# Patient Record
Sex: MALE | Race: WHITE | Employment: FULL TIME | URBAN - METROPOLITAN AREA
[De-identification: names, ages, dates, MRNs, and addresses within clinical notes are randomized per-mention and may not be internally consistent; named-entity substitution may affect disease eponyms.]

---

## 2017-03-03 ENCOUNTER — LAB ENCOUNTER (OUTPATIENT)
Dept: LAB | Facility: HOSPITAL | Age: 62
End: 2017-03-03
Attending: INTERNAL MEDICINE
Payer: COMMERCIAL

## 2017-03-03 DIAGNOSIS — Z00.00 ROUTINE GENERAL MEDICAL EXAMINATION AT A HEALTH CARE FACILITY: ICD-10-CM

## 2017-03-03 DIAGNOSIS — E55.9 AVITAMINOSIS D: ICD-10-CM

## 2017-03-03 DIAGNOSIS — Z12.5 SPECIAL SCREENING FOR MALIGNANT NEOPLASM OF PROSTATE: ICD-10-CM

## 2017-03-03 DIAGNOSIS — Z11.59 SCREENING EXAMINATION FOR POLIOMYELITIS: ICD-10-CM

## 2017-03-03 DIAGNOSIS — Z01.818 PREOP EXAMINATION: Primary | ICD-10-CM

## 2017-03-03 LAB
ALBUMIN SERPL BCP-MCNC: 3.9 G/DL (ref 3.5–4.8)
ALBUMIN/GLOB SERPL: 1.4 {RATIO} (ref 1–2)
ALP SERPL-CCNC: 72 U/L (ref 32–100)
ALT SERPL-CCNC: 23 U/L (ref 17–63)
ANION GAP SERPL CALC-SCNC: 7 MMOL/L (ref 0–18)
ANTIBODY SCREEN: NEGATIVE
AST SERPL-CCNC: 20 U/L (ref 15–41)
BASOPHILS # BLD: 0.1 K/UL (ref 0–0.2)
BASOPHILS NFR BLD: 1 %
BILIRUB SERPL-MCNC: 1.2 MG/DL (ref 0.3–1.2)
BUN SERPL-MCNC: 20 MG/DL (ref 8–20)
BUN/CREAT SERPL: 21.5 (ref 10–20)
CALCIUM SERPL-MCNC: 9.2 MG/DL (ref 8.5–10.5)
CHLORIDE SERPL-SCNC: 106 MMOL/L (ref 95–110)
CHOLEST SERPL-MCNC: 154 MG/DL (ref 110–200)
CO2 SERPL-SCNC: 27 MMOL/L (ref 22–32)
CREAT SERPL-MCNC: 0.93 MG/DL (ref 0.5–1.5)
EOSINOPHIL # BLD: 0.2 K/UL (ref 0–0.7)
EOSINOPHIL NFR BLD: 4 %
ERYTHROCYTE [DISTWIDTH] IN BLOOD BY AUTOMATED COUNT: 13.2 % (ref 11–15)
GLOBULIN PLAS-MCNC: 2.8 G/DL (ref 2.5–3.7)
GLUCOSE SERPL-MCNC: 108 MG/DL (ref 70–99)
HAV AB SER QL IA: NONREACTIVE
HBV CORE AB SERPL QL IA: NONREACTIVE
HBV SURFACE AB SER-ACNC: 4.69 MIU/ML (ref ?–10)
HBV SURFACE AG SERPL QL IA: NONREACTIVE
HBV SURFACE AG SERPL QL IA: NONREACTIVE
HCT VFR BLD AUTO: 43 % (ref 41–52)
HCV AB SERPL QL IA: NONREACTIVE
HDLC SERPL-MCNC: 45 MG/DL
HGB BLD-MCNC: 14.5 G/DL (ref 13.5–17.5)
LDLC SERPL CALC-MCNC: 94 MG/DL (ref 0–99)
LYMPHOCYTES # BLD: 1.3 K/UL (ref 1–4)
LYMPHOCYTES NFR BLD: 20 %
MCH RBC QN AUTO: 30.1 PG (ref 27–32)
MCHC RBC AUTO-ENTMCNC: 33.7 G/DL (ref 32–37)
MCV RBC AUTO: 89.4 FL (ref 80–100)
MONOCYTES # BLD: 0.5 K/UL (ref 0–1)
MONOCYTES NFR BLD: 8 %
MRSA DNA SPEC QL NAA+PROBE: NEGATIVE
NEUTROPHILS # BLD AUTO: 4.4 K/UL (ref 1.8–7.7)
NEUTROPHILS NFR BLD: 68 %
NONHDLC SERPL-MCNC: 109 MG/DL
OSMOLALITY UR CALC.SUM OF ELEC: 293 MOSM/KG (ref 275–295)
PLATELET # BLD AUTO: 195 K/UL (ref 140–400)
PMV BLD AUTO: 8.4 FL (ref 7.4–10.3)
POTASSIUM SERPL-SCNC: 4.2 MMOL/L (ref 3.3–5.1)
PROT SERPL-MCNC: 6.7 G/DL (ref 5.9–8.4)
PSA SERPL-MCNC: 0.6 NG/ML (ref 0–4)
RBC # BLD AUTO: 4.81 M/UL (ref 4.5–5.9)
RH BLOOD TYPE: POSITIVE
SODIUM SERPL-SCNC: 140 MMOL/L (ref 136–144)
TRIGL SERPL-MCNC: 73 MG/DL (ref 1–149)
WBC # BLD AUTO: 6.5 K/UL (ref 4–11)

## 2017-03-03 PROCEDURE — 80053 COMPREHEN METABOLIC PANEL: CPT

## 2017-03-03 PROCEDURE — 85025 COMPLETE CBC W/AUTO DIFF WBC: CPT

## 2017-03-03 PROCEDURE — 86850 RBC ANTIBODY SCREEN: CPT

## 2017-03-03 PROCEDURE — 86704 HEP B CORE ANTIBODY TOTAL: CPT

## 2017-03-03 PROCEDURE — 86708 HEPATITIS A ANTIBODY: CPT

## 2017-03-03 PROCEDURE — 80500 HEPATITIS A B + C PROFILE: CPT

## 2017-03-03 PROCEDURE — 87641 MR-STAPH DNA AMP PROBE: CPT

## 2017-03-03 PROCEDURE — 86706 HEP B SURFACE ANTIBODY: CPT

## 2017-03-03 PROCEDURE — 87340 HEPATITIS B SURFACE AG IA: CPT

## 2017-03-03 PROCEDURE — 86900 BLOOD TYPING SEROLOGIC ABO: CPT

## 2017-03-03 PROCEDURE — 80061 LIPID PANEL: CPT

## 2017-03-03 PROCEDURE — 36415 COLL VENOUS BLD VENIPUNCTURE: CPT

## 2017-03-03 PROCEDURE — 86901 BLOOD TYPING SEROLOGIC RH(D): CPT

## 2017-03-03 PROCEDURE — 82306 VITAMIN D 25 HYDROXY: CPT

## 2017-03-03 PROCEDURE — 86803 HEPATITIS C AB TEST: CPT

## 2017-03-06 LAB — 25(OH)D3 SERPL-MCNC: 24.7 NG/ML

## 2017-03-06 RX ORDER — ALPRAZOLAM 0.25 MG/1
0.25 TABLET ORAL NIGHTLY PRN
COMMUNITY

## 2017-03-06 RX ORDER — METOCLOPRAMIDE 10 MG/1
10 TABLET ORAL ONCE
Status: DISCONTINUED | OUTPATIENT
Start: 2017-03-06 | End: 2017-03-13 | Stop reason: HOSPADM

## 2017-03-06 RX ORDER — ACETAMINOPHEN 325 MG/1
650 TABLET ORAL ONCE
Status: DISCONTINUED | OUTPATIENT
Start: 2017-03-06 | End: 2017-03-13 | Stop reason: HOSPADM

## 2017-03-06 RX ORDER — MELOXICAM 7.5 MG/1
TABLET ORAL 2 TIMES DAILY
Status: ON HOLD | COMMUNITY
End: 2017-03-13

## 2017-03-06 RX ORDER — SODIUM CHLORIDE, SODIUM LACTATE, POTASSIUM CHLORIDE, CALCIUM CHLORIDE 600; 310; 30; 20 MG/100ML; MG/100ML; MG/100ML; MG/100ML
INJECTION, SOLUTION INTRAVENOUS CONTINUOUS
Status: DISCONTINUED | OUTPATIENT
Start: 2017-03-06 | End: 2017-03-16

## 2017-03-06 RX ORDER — FAMOTIDINE 20 MG/1
20 TABLET ORAL ONCE
Status: DISCONTINUED | OUTPATIENT
Start: 2017-03-06 | End: 2017-03-13 | Stop reason: HOSPADM

## 2017-03-08 NOTE — H&P
[facility]    PATIENT'S NAME: Carlton Campo   ATTENDING PHYSICIAN: Kashmir Oliveira MD   PATIENT ACCOUNT#:   [de-identified]    LOCATION:  [facility]  MEDICAL RECORD #:   [id number]       YOB: 1955  ADMISSION DATE:       03/13/2017 The patient is a former smoker, quitting in 2008. Alcohol use, minimal.      REVIEW OF SYSTEMS:  Patient denies any bleeding problems, headaches, fevers, chills, sweats, night sweats, weight loss, or weight gain.   The patient has not had any chest pain o were negative. EKG reveals normal sinus rhythm with occasional premature atrial contraction. There are no acute changes. IMPRESSION AND PLAN ON ADMISSION:    1. Preoperative evaluation for left total knee arthroplasty.   Patient is set for surge

## 2017-03-12 ENCOUNTER — ANESTHESIA EVENT (OUTPATIENT)
Dept: SURGERY | Facility: HOSPITAL | Age: 62
DRG: 470 | End: 2017-03-12
Payer: COMMERCIAL

## 2017-03-13 ENCOUNTER — APPOINTMENT (OUTPATIENT)
Dept: GENERAL RADIOLOGY | Facility: HOSPITAL | Age: 62
DRG: 470 | End: 2017-03-13
Attending: ORTHOPAEDIC SURGERY
Payer: COMMERCIAL

## 2017-03-13 ENCOUNTER — SURGERY (OUTPATIENT)
Age: 62
End: 2017-03-13

## 2017-03-13 ENCOUNTER — ANESTHESIA (OUTPATIENT)
Dept: SURGERY | Facility: HOSPITAL | Age: 62
DRG: 470 | End: 2017-03-13
Payer: COMMERCIAL

## 2017-03-13 ENCOUNTER — HOSPITAL ENCOUNTER (INPATIENT)
Facility: HOSPITAL | Age: 62
LOS: 5 days | Discharge: HOME HEALTH | DRG: 470 | End: 2017-03-18
Attending: ORTHOPAEDIC SURGERY | Admitting: ORTHOPAEDIC SURGERY
Payer: COMMERCIAL

## 2017-03-13 DIAGNOSIS — M17.12 PRIMARY OSTEOARTHRITIS OF LEFT KNEE: Primary | ICD-10-CM

## 2017-03-13 PROCEDURE — 3E0T3CZ INTRODUCTION OF REGIONAL ANESTHETIC INTO PERIPHERAL NERVES AND PLEXI, PERCUTANEOUS APPROACH: ICD-10-PCS | Performed by: ANESTHESIOLOGY

## 2017-03-13 PROCEDURE — 64450 NJX AA&/STRD OTHER PN/BRANCH: CPT | Performed by: ANESTHESIOLOGY

## 2017-03-13 PROCEDURE — 0SRD0J9 REPLACEMENT OF LEFT KNEE JOINT WITH SYNTHETIC SUBSTITUTE, CEMENTED, OPEN APPROACH: ICD-10-PCS | Performed by: ORTHOPAEDIC SURGERY

## 2017-03-13 PROCEDURE — 97116 GAIT TRAINING THERAPY: CPT

## 2017-03-13 PROCEDURE — 64445 NJX AA&/STRD SCIATIC NRV IMG: CPT | Performed by: ORTHOPAEDIC SURGERY

## 2017-03-13 PROCEDURE — 64447 NJX AA&/STRD FEMORAL NRV IMG: CPT | Performed by: ORTHOPAEDIC SURGERY

## 2017-03-13 PROCEDURE — 88305 TISSUE EXAM BY PATHOLOGIST: CPT | Performed by: ORTHOPAEDIC SURGERY

## 2017-03-13 PROCEDURE — 76942 ECHO GUIDE FOR BIOPSY: CPT | Performed by: ORTHOPAEDIC SURGERY

## 2017-03-13 PROCEDURE — 73560 X-RAY EXAM OF KNEE 1 OR 2: CPT

## 2017-03-13 PROCEDURE — 88311 DECALCIFY TISSUE: CPT | Performed by: ORTHOPAEDIC SURGERY

## 2017-03-13 PROCEDURE — 97162 PT EVAL MOD COMPLEX 30 MIN: CPT

## 2017-03-13 PROCEDURE — 99152 MOD SED SAME PHYS/QHP 5/>YRS: CPT | Performed by: ORTHOPAEDIC SURGERY

## 2017-03-13 DEVICE — IMPLANTABLE DEVICE: Type: IMPLANTABLE DEVICE | Site: KNEE | Status: FUNCTIONAL

## 2017-03-13 DEVICE — COMPONENT PTLR 34MM 1 PG WRE: Type: IMPLANTABLE DEVICE | Status: FUNCTIONAL

## 2017-03-13 DEVICE — CEMENT BONE ZIM PALICOS R: Type: IMPLANTABLE DEVICE | Status: FUNCTIONAL

## 2017-03-13 RX ORDER — METOCLOPRAMIDE 10 MG/1
10 TABLET ORAL ONCE
Status: COMPLETED | OUTPATIENT
Start: 2017-03-13 | End: 2017-03-13

## 2017-03-13 RX ORDER — ONDANSETRON 2 MG/ML
4 INJECTION INTRAMUSCULAR; INTRAVENOUS ONCE AS NEEDED
Status: ACTIVE | OUTPATIENT
Start: 2017-03-13 | End: 2017-03-13

## 2017-03-13 RX ORDER — BUPIVACAINE HYDROCHLORIDE 7.5 MG/ML
INJECTION, SOLUTION INTRASPINAL AS NEEDED
Status: DISCONTINUED | OUTPATIENT
Start: 2017-03-13 | End: 2017-03-13 | Stop reason: SURG

## 2017-03-13 RX ORDER — SODIUM CHLORIDE 9 MG/ML
INJECTION, SOLUTION INTRAVENOUS
Status: COMPLETED
Start: 2017-03-13 | End: 2017-03-13

## 2017-03-13 RX ORDER — HYDROMORPHONE HYDROCHLORIDE 1 MG/ML
0.2 INJECTION, SOLUTION INTRAMUSCULAR; INTRAVENOUS; SUBCUTANEOUS EVERY 5 MIN PRN
Status: DISCONTINUED | OUTPATIENT
Start: 2017-03-13 | End: 2017-03-13 | Stop reason: HOSPADM

## 2017-03-13 RX ORDER — DIPHENHYDRAMINE HYDROCHLORIDE 50 MG/ML
12.5 INJECTION INTRAMUSCULAR; INTRAVENOUS EVERY 4 HOURS PRN
Status: ACTIVE | OUTPATIENT
Start: 2017-03-13 | End: 2017-03-14

## 2017-03-13 RX ORDER — WARFARIN SODIUM 5 MG/1
5 TABLET ORAL ONCE
Status: COMPLETED | OUTPATIENT
Start: 2017-03-13 | End: 2017-03-13

## 2017-03-13 RX ORDER — DIPHENHYDRAMINE HCL 25 MG
25 CAPSULE ORAL EVERY 4 HOURS PRN
Status: DISPENSED | OUTPATIENT
Start: 2017-03-13 | End: 2017-03-14

## 2017-03-13 RX ORDER — MAGNESIUM HYDROXIDE 1200 MG/15ML
LIQUID ORAL CONTINUOUS PRN
Status: DISCONTINUED | OUTPATIENT
Start: 2017-03-13 | End: 2017-03-13

## 2017-03-13 RX ORDER — LORAZEPAM 1 MG/1
1 TABLET ORAL EVERY 6 HOURS PRN
Status: DISCONTINUED | OUTPATIENT
Start: 2017-03-13 | End: 2017-03-18

## 2017-03-13 RX ORDER — HYDROMORPHONE HYDROCHLORIDE 1 MG/ML
0.6 INJECTION, SOLUTION INTRAMUSCULAR; INTRAVENOUS; SUBCUTANEOUS
Status: DISPENSED | OUTPATIENT
Start: 2017-03-13 | End: 2017-03-14

## 2017-03-13 RX ORDER — MORPHINE SULFATE 2 MG/ML
2 INJECTION, SOLUTION INTRAMUSCULAR; INTRAVENOUS EVERY 10 MIN PRN
Status: DISCONTINUED | OUTPATIENT
Start: 2017-03-13 | End: 2017-03-13 | Stop reason: HOSPADM

## 2017-03-13 RX ORDER — ONDANSETRON 2 MG/ML
4 INJECTION INTRAMUSCULAR; INTRAVENOUS ONCE AS NEEDED
Status: DISCONTINUED | OUTPATIENT
Start: 2017-03-13 | End: 2017-03-13

## 2017-03-13 RX ORDER — NALOXONE HYDROCHLORIDE 0.4 MG/ML
80 INJECTION, SOLUTION INTRAMUSCULAR; INTRAVENOUS; SUBCUTANEOUS AS NEEDED
Status: DISCONTINUED | OUTPATIENT
Start: 2017-03-13 | End: 2017-03-13 | Stop reason: HOSPADM

## 2017-03-13 RX ORDER — ACETAMINOPHEN 325 MG/1
650 TABLET ORAL EVERY 6 HOURS PRN
Status: DISCONTINUED | OUTPATIENT
Start: 2017-03-13 | End: 2017-03-14

## 2017-03-13 RX ORDER — LIDOCAINE HYDROCHLORIDE 10 MG/ML
INJECTION, SOLUTION EPIDURAL; INFILTRATION; INTRACAUDAL; PERINEURAL AS NEEDED
Status: DISCONTINUED | OUTPATIENT
Start: 2017-03-13 | End: 2017-03-13 | Stop reason: SURG

## 2017-03-13 RX ORDER — FAMOTIDINE 20 MG/1
20 TABLET ORAL ONCE
Status: COMPLETED | OUTPATIENT
Start: 2017-03-13 | End: 2017-03-13

## 2017-03-13 RX ORDER — MORPHINE SULFATE 10 MG/ML
6 INJECTION, SOLUTION INTRAMUSCULAR; INTRAVENOUS EVERY 10 MIN PRN
Status: DISCONTINUED | OUTPATIENT
Start: 2017-03-13 | End: 2017-03-13 | Stop reason: HOSPADM

## 2017-03-13 RX ORDER — ENOXAPARIN SODIUM 100 MG/ML
30 INJECTION SUBCUTANEOUS EVERY 12 HOURS SCHEDULED
Status: DISCONTINUED | OUTPATIENT
Start: 2017-03-13 | End: 2017-03-18

## 2017-03-13 RX ORDER — SODIUM CHLORIDE, SODIUM LACTATE, POTASSIUM CHLORIDE, CALCIUM CHLORIDE 600; 310; 30; 20 MG/100ML; MG/100ML; MG/100ML; MG/100ML
INJECTION, SOLUTION INTRAVENOUS CONTINUOUS
Status: DISCONTINUED | OUTPATIENT
Start: 2017-03-13 | End: 2017-03-16

## 2017-03-13 RX ORDER — CEFAZOLIN SODIUM 1 G/3ML
INJECTION, POWDER, FOR SOLUTION INTRAMUSCULAR; INTRAVENOUS AS NEEDED
Status: DISCONTINUED | OUTPATIENT
Start: 2017-03-13 | End: 2017-03-13 | Stop reason: SURG

## 2017-03-13 RX ORDER — NALOXONE HYDROCHLORIDE 0.4 MG/ML
0.08 INJECTION, SOLUTION INTRAMUSCULAR; INTRAVENOUS; SUBCUTANEOUS
Status: ACTIVE | OUTPATIENT
Start: 2017-03-13 | End: 2017-03-14

## 2017-03-13 RX ORDER — HYDROMORPHONE HYDROCHLORIDE 1 MG/ML
0.6 INJECTION, SOLUTION INTRAMUSCULAR; INTRAVENOUS; SUBCUTANEOUS EVERY 5 MIN PRN
Status: DISCONTINUED | OUTPATIENT
Start: 2017-03-13 | End: 2017-03-13 | Stop reason: HOSPADM

## 2017-03-13 RX ORDER — HALOPERIDOL 5 MG/ML
0.5 INJECTION INTRAMUSCULAR ONCE AS NEEDED
Status: DISPENSED | OUTPATIENT
Start: 2017-03-13 | End: 2017-03-13

## 2017-03-13 RX ORDER — HYDROCODONE BITARTRATE AND ACETAMINOPHEN 7.5; 325 MG/1; MG/1
2 TABLET ORAL EVERY 6 HOURS PRN
Status: DISCONTINUED | OUTPATIENT
Start: 2017-03-13 | End: 2017-03-14

## 2017-03-13 RX ORDER — ALPRAZOLAM 0.25 MG/1
0.25 TABLET ORAL NIGHTLY PRN
Status: DISCONTINUED | OUTPATIENT
Start: 2017-03-13 | End: 2017-03-18

## 2017-03-13 RX ORDER — MIDAZOLAM HYDROCHLORIDE 1 MG/ML
INJECTION INTRAMUSCULAR; INTRAVENOUS AS NEEDED
Status: DISCONTINUED | OUTPATIENT
Start: 2017-03-13 | End: 2017-03-13 | Stop reason: SURG

## 2017-03-13 RX ORDER — HYDROMORPHONE HYDROCHLORIDE 1 MG/ML
0.4 INJECTION, SOLUTION INTRAMUSCULAR; INTRAVENOUS; SUBCUTANEOUS EVERY 5 MIN PRN
Status: DISCONTINUED | OUTPATIENT
Start: 2017-03-13 | End: 2017-03-13 | Stop reason: HOSPADM

## 2017-03-13 RX ORDER — DEXAMETHASONE SODIUM PHOSPHATE 10 MG/ML
INJECTION, SOLUTION INTRAMUSCULAR; INTRAVENOUS AS NEEDED
Status: DISCONTINUED | OUTPATIENT
Start: 2017-03-13 | End: 2017-03-13 | Stop reason: SURG

## 2017-03-13 RX ORDER — SODIUM CHLORIDE 0.9 % (FLUSH) 0.9 %
10 SYRINGE (ML) INJECTION AS NEEDED
Status: DISCONTINUED | OUTPATIENT
Start: 2017-03-13 | End: 2017-03-18

## 2017-03-13 RX ORDER — HYDROMORPHONE HYDROCHLORIDE 1 MG/ML
0.4 INJECTION, SOLUTION INTRAMUSCULAR; INTRAVENOUS; SUBCUTANEOUS
Status: DISPENSED | OUTPATIENT
Start: 2017-03-13 | End: 2017-03-14

## 2017-03-13 RX ORDER — HYDROCODONE BITARTRATE AND ACETAMINOPHEN 7.5; 325 MG/1; MG/1
1 TABLET ORAL EVERY 6 HOURS PRN
Status: DISCONTINUED | OUTPATIENT
Start: 2017-03-13 | End: 2017-03-14

## 2017-03-13 RX ORDER — MORPHINE SULFATE 4 MG/ML
4 INJECTION, SOLUTION INTRAMUSCULAR; INTRAVENOUS EVERY 10 MIN PRN
Status: DISCONTINUED | OUTPATIENT
Start: 2017-03-13 | End: 2017-03-13 | Stop reason: HOSPADM

## 2017-03-13 RX ORDER — NALBUPHINE HCL 10 MG/ML
2.5 AMPUL (ML) INJECTION EVERY 4 HOURS PRN
Status: DISCONTINUED | OUTPATIENT
Start: 2017-03-13 | End: 2017-03-18

## 2017-03-13 RX ORDER — ROPIVACAINE HYDROCHLORIDE 5 MG/ML
INJECTION, SOLUTION EPIDURAL; INFILTRATION; PERINEURAL AS NEEDED
Status: DISCONTINUED | OUTPATIENT
Start: 2017-03-13 | End: 2017-03-13 | Stop reason: SURG

## 2017-03-13 RX ORDER — MORPHINE SULFATE 1 MG/ML
INJECTION, SOLUTION EPIDURAL; INTRATHECAL; INTRAVENOUS AS NEEDED
Status: DISCONTINUED | OUTPATIENT
Start: 2017-03-13 | End: 2017-03-13 | Stop reason: SURG

## 2017-03-13 RX ORDER — HYDROCODONE BITARTRATE AND ACETAMINOPHEN 5; 325 MG/1; MG/1
2 TABLET ORAL AS NEEDED
Status: DISCONTINUED | OUTPATIENT
Start: 2017-03-13 | End: 2017-03-13 | Stop reason: HOSPADM

## 2017-03-13 RX ORDER — ONDANSETRON 2 MG/ML
INJECTION INTRAMUSCULAR; INTRAVENOUS AS NEEDED
Status: DISCONTINUED | OUTPATIENT
Start: 2017-03-13 | End: 2017-03-13 | Stop reason: SURG

## 2017-03-13 RX ORDER — HYDROCODONE BITARTRATE AND ACETAMINOPHEN 5; 325 MG/1; MG/1
1 TABLET ORAL AS NEEDED
Status: DISCONTINUED | OUTPATIENT
Start: 2017-03-13 | End: 2017-03-13 | Stop reason: HOSPADM

## 2017-03-13 RX ADMIN — DEXAMETHASONE SODIUM PHOSPHATE 5 MG: 10 INJECTION, SOLUTION INTRAMUSCULAR; INTRAVENOUS at 07:10:00

## 2017-03-13 RX ADMIN — SODIUM CHLORIDE, SODIUM LACTATE, POTASSIUM CHLORIDE, CALCIUM CHLORIDE: 600; 310; 30; 20 INJECTION, SOLUTION INTRAVENOUS at 08:05:00

## 2017-03-13 RX ADMIN — SODIUM CHLORIDE, SODIUM LACTATE, POTASSIUM CHLORIDE, CALCIUM CHLORIDE: 600; 310; 30; 20 INJECTION, SOLUTION INTRAVENOUS at 09:10:00

## 2017-03-13 RX ADMIN — ONDANSETRON 4 MG: 2 INJECTION INTRAMUSCULAR; INTRAVENOUS at 10:16:00

## 2017-03-13 RX ADMIN — LIDOCAINE HYDROCHLORIDE 25 MG: 10 INJECTION, SOLUTION EPIDURAL; INFILTRATION; INTRACAUDAL; PERINEURAL at 07:44:00

## 2017-03-13 RX ADMIN — ROPIVACAINE HYDROCHLORIDE 30 ML: 5 INJECTION, SOLUTION EPIDURAL; INFILTRATION; PERINEURAL at 07:10:00

## 2017-03-13 RX ADMIN — CEFAZOLIN SODIUM 2 G: 1 INJECTION, POWDER, FOR SOLUTION INTRAMUSCULAR; INTRAVENOUS at 07:49:00

## 2017-03-13 RX ADMIN — MIDAZOLAM HYDROCHLORIDE 2 MG: 1 INJECTION INTRAMUSCULAR; INTRAVENOUS at 07:13:00

## 2017-03-13 RX ADMIN — SODIUM CHLORIDE, SODIUM LACTATE, POTASSIUM CHLORIDE, CALCIUM CHLORIDE: 600; 310; 30; 20 INJECTION, SOLUTION INTRAVENOUS at 09:11:00

## 2017-03-13 RX ADMIN — BUPIVACAINE HYDROCHLORIDE 1.4 ML: 7.5 INJECTION, SOLUTION INTRASPINAL at 07:46:00

## 2017-03-13 RX ADMIN — MORPHINE SULFATE 0.3 MG: 1 INJECTION, SOLUTION EPIDURAL; INTRATHECAL; INTRAVENOUS at 07:46:00

## 2017-03-13 RX ADMIN — ROPIVACAINE HYDROCHLORIDE 15 ML: 5 INJECTION, SOLUTION EPIDURAL; INFILTRATION; PERINEURAL at 07:23:00

## 2017-03-13 RX ADMIN — SODIUM CHLORIDE, SODIUM LACTATE, POTASSIUM CHLORIDE, CALCIUM CHLORIDE: 600; 310; 30; 20 INJECTION, SOLUTION INTRAVENOUS at 07:30:00

## 2017-03-13 RX ADMIN — DEXAMETHASONE SODIUM PHOSPHATE 5 MG: 10 INJECTION, SOLUTION INTRAMUSCULAR; INTRAVENOUS at 07:23:00

## 2017-03-13 RX ADMIN — LIDOCAINE HYDROCHLORIDE 25 MG: 10 INJECTION, SOLUTION EPIDURAL; INFILTRATION; INTRACAUDAL; PERINEURAL at 07:13:00

## 2017-03-13 RX ADMIN — LIDOCAINE HYDROCHLORIDE 20 MG: 10 INJECTION, SOLUTION EPIDURAL; INFILTRATION; INTRACAUDAL; PERINEURAL at 08:00:00

## 2017-03-13 RX ADMIN — LIDOCAINE HYDROCHLORIDE 25 MG: 10 INJECTION, SOLUTION EPIDURAL; INFILTRATION; INTRACAUDAL; PERINEURAL at 06:58:00

## 2017-03-13 RX ADMIN — MIDAZOLAM HYDROCHLORIDE 2 MG: 1 INJECTION INTRAMUSCULAR; INTRAVENOUS at 07:00:00

## 2017-03-13 NOTE — PHYSICAL THERAPY NOTE
PHYSICAL THERAPY KNEE EVALUATION - INPATIENT     Room Number: 151/811-C  Evaluation Date: 3/13/2017  Type of Evaluation: Initial  Physician Order: PT Eval and Treat    Presenting Problem: L TKA  Reason for Therapy: Mobility Dysfunction and Discharge Planni Right     TOTAL KNEE REPLACEMENT Left        HOME SITUATION  Type of Home: House   Home Layout: Two level  Stairs to Enter : 1 (platform step)  Railing: No  Stairs to Bedroom: 12  Railing: Yes    Lives With: Alone  Drives: Yes  Patient Owned Equipment: Jessica Mensah Score: 17   PT Approx Degree of Impairment Score: 50.57%   Standardized Score (AM-PAC Scale): 42.13   CMS Modifier (G-Code): CK    FUNCTIONAL ABILITY STATUS  Gait Assessment   Gait Assistance: Other (Comment) (CGA)  Distance (ft): 2  Assistive Device: Roll

## 2017-03-13 NOTE — ANESTHESIA PROCEDURE NOTES
Spinal Block  Performed by: BELL SAMPSON  Authorized by: BELL SAMPSON    Patient Location:  OR  Start Time:  3/13/2017 7:42 AM  End Time:  3/13/2017 7:48 AM  Site identification: surface landmarks    Reason for Block: surgical anesthesia    Anesthesi aspiration for heme and no pain on injection  Heart Rate Change: No    Slow Fractionated Injection: Yes     30 ML 0.5 ROPIVICAINE + DECADRON pf 5MG  Peripheral Block    Anesthesiologist:  BELL SAMPSON  Patient Location:  PACU  Start Time:  3/13/2017 7:1

## 2017-03-13 NOTE — DISCHARGE PLANNING
3/13CM-MD orders received in regards to discharge planning. The Patient was seen at bedside. The Patient resides alone  in [facility] in a [facility] with 12 stairs to the 2nd floor.   Prior to hospitalization, the Patient was driving,  grocery shopping, er

## 2017-03-13 NOTE — INTERVAL H&P NOTE
Pre-op Diagnosis: primary osteoarthritis left knee    The above referenced H&P was reviewed by Ruby Sands MD on 3/13/2017, the patient was examined and no significant changes have occurred in the patient's condition since the H&P was performed.   I discu

## 2017-03-13 NOTE — ANESTHESIA POSTPROCEDURE EVALUATION
Patient: Mary Granados    Procedure Summary     Date Anesthesia Start Anesthesia Stop Room / Location    03/13/17 0733 1042 300 [facility] MAIN OR 12 / 300 [facility] MAIN OR       Procedure Diagnosis Surgeon Responsible Provider    KNEE TOTAL REPLACEMENT (Left Knee) (prima

## 2017-03-13 NOTE — PROGRESS NOTES
ORTHO SURG: PAIN IS WELL CONTROLLED.   MOTORS TO LEFT ANKLE / HINDFOOT / TOES ARE ALL 5/5, NORMAL LIGHT TOUCH IS INTACT TO ALL AREAS OF THE LEFT FOOT

## 2017-03-13 NOTE — RESPIRATORY THERAPY NOTE
MIRELA ASSESSMENT:    Pt does not have a previous diagnosis of MIRELA. Pt does not routinely use a CPAP device at home. This pt is not suspected to be at high risk for MIRELA and sleep lab packet was not provided to patient for outpatient follow-up.     CPAP INITIAT

## 2017-03-13 NOTE — PROGRESS NOTES
[facility]    Progress Note    Tavia Chuy Patient Status:  Inpatient    1955 MRN [id number]   Location [facility] 4W/SW/SE Attending Al Pickens MD   Hosp Day # 0 PCP Harry Pepper MD       Subjective:   Abraham Ache CONCLUSION:  * Total knee arthroplasty is identified. * The prostheses are well seated in the femur, tibia and patella. * A drain is seen in place. * No obvious complication.               Assessment and Plan:     Primary osteoarthritis of left knee  S/p le

## 2017-03-13 NOTE — ANESTHESIA PREPROCEDURE EVALUATION
Anesthesia PreOp Note    HPI:     Romy Peterson is a 62year old male who presents for preoperative consultation requested by: Luiza Hill MD    Date of Surgery: 3/13/2017    Procedure(s):  KNEE TOTAL REPLACEMENT  Indication: primary osteoarthritis Comment: SOCIALLY    Drug Use: No    Sexual Activity: Not on file   Not on file  Other Topics Concern   None on file     Social History Narrative       Available pre-op labs reviewed.     Lab Results  Component Value Date   WBC 6.5 03/03/2017   RBC 4.81 03/ forms of anesthetic management. All of the patient's questions were answered to the best of my ability. The patient desires the anesthetic management as planned.   BELL SAMPSON  3/13/2017 7:26 AM

## 2017-03-14 PROCEDURE — 97535 SELF CARE MNGMENT TRAINING: CPT

## 2017-03-14 PROCEDURE — 85610 PROTHROMBIN TIME: CPT | Performed by: ORTHOPAEDIC SURGERY

## 2017-03-14 PROCEDURE — 97165 OT EVAL LOW COMPLEX 30 MIN: CPT

## 2017-03-14 PROCEDURE — 97116 GAIT TRAINING THERAPY: CPT

## 2017-03-14 PROCEDURE — 97110 THERAPEUTIC EXERCISES: CPT

## 2017-03-14 PROCEDURE — 80048 BASIC METABOLIC PNL TOTAL CA: CPT | Performed by: INTERNAL MEDICINE

## 2017-03-14 PROCEDURE — 85025 COMPLETE CBC W/AUTO DIFF WBC: CPT | Performed by: INTERNAL MEDICINE

## 2017-03-14 RX ORDER — DOCUSATE SODIUM 100 MG/1
100 CAPSULE, LIQUID FILLED ORAL 2 TIMES DAILY
Status: DISCONTINUED | OUTPATIENT
Start: 2017-03-14 | End: 2017-03-18

## 2017-03-14 RX ORDER — HYDROMORPHONE HYDROCHLORIDE 1 MG/ML
0.75 INJECTION, SOLUTION INTRAMUSCULAR; INTRAVENOUS; SUBCUTANEOUS EVERY 2 HOUR PRN
Status: DISCONTINUED | OUTPATIENT
Start: 2017-03-14 | End: 2017-03-18

## 2017-03-14 RX ORDER — WARFARIN SODIUM 5 MG/1
5 TABLET ORAL ONCE
Status: COMPLETED | OUTPATIENT
Start: 2017-03-14 | End: 2017-03-14

## 2017-03-14 RX ORDER — POLYETHYLENE GLYCOL 3350 17 G/17G
17 POWDER, FOR SOLUTION ORAL DAILY
Status: DISCONTINUED | OUTPATIENT
Start: 2017-03-14 | End: 2017-03-18

## 2017-03-14 RX ORDER — OXYCODONE AND ACETAMINOPHEN 7.5; 325 MG/1; MG/1
1 TABLET ORAL EVERY 4 HOURS PRN
Status: DISCONTINUED | OUTPATIENT
Start: 2017-03-14 | End: 2017-03-15

## 2017-03-14 RX ORDER — HYDROCODONE BITARTRATE AND ACETAMINOPHEN 10; 325 MG/1; MG/1
1 TABLET ORAL EVERY 4 HOURS PRN
Status: DISCONTINUED | OUTPATIENT
Start: 2017-03-14 | End: 2017-03-18

## 2017-03-14 RX ORDER — HYDROCODONE BITARTRATE AND ACETAMINOPHEN 7.5; 325 MG/1; MG/1
1 TABLET ORAL EVERY 4 HOURS PRN
Status: DISCONTINUED | OUTPATIENT
Start: 2017-03-14 | End: 2017-03-18

## 2017-03-14 RX ORDER — WARFARIN SODIUM 5 MG/1
5 TABLET ORAL ONCE
Status: DISCONTINUED | OUTPATIENT
Start: 2017-03-14 | End: 2017-03-14

## 2017-03-14 RX ORDER — HYDROCODONE BITARTRATE AND ACETAMINOPHEN 5; 325 MG/1; MG/1
1 TABLET ORAL EVERY 4 HOURS PRN
Status: DISCONTINUED | OUTPATIENT
Start: 2017-03-14 | End: 2017-03-18

## 2017-03-14 RX ORDER — GABAPENTIN 100 MG/1
200 CAPSULE ORAL 3 TIMES DAILY
Status: DISCONTINUED | OUTPATIENT
Start: 2017-03-14 | End: 2017-03-16

## 2017-03-14 NOTE — CONSULTS
pod1 s/p left knee replacement with duramorph spinal  Pt tolerated procedure well   Minimal pain this am  No headache / backache pt doing well cpm

## 2017-03-14 NOTE — PHYSICAL THERAPY NOTE
PHYSICAL THERAPY KNEE TREATMENT NOTE - INPATIENT     Room Number: 142/548-J             Presenting Problem: L TKA    Problem List  Active Problems:    Primary osteoarthritis of left knee      ASSESSMENT   Min a for bed mobility and transfer. Pt amb 2 x 40 Session   Ankle Pumps   20 reps 20 reps   Quad Sets 20 reps 20 reps   Glut Sets 20 reps 20 reps   Hip Abd/Add 20 reps 20 reps   Heel slides 20 reps 20 reps   Saq 0 reps 0 reps   SLR 0 reps 0 reps   Sitting Knee Flexion 0 reps 0 reps   Standing heel/toe zayas

## 2017-03-14 NOTE — OCCUPATIONAL THERAPY NOTE
OCCUPATIONAL THERAPY EVALUATION - INPATIENT      Room Number: 226/935-E  Evaluation Date: 3/14/2017  Type of Evaluation: Initial  Presenting Problem:  (s/p LT TKA)    Physician Order: IP Consult to Occupational Therapy  Reason for Therapy: ADL/IADL Dysfunc can't do that right now\"    Patient self-stated goal is: to go home soon  OBJECTIVE     Fall Risk: High fall risk    WEIGHT BEARING RESTRICTION  Weight Bearing Restriction: L lower extremity           L Lower Extremity: Weight Bearing as Tolerated    PAIN complete LE dressing with I  Comment:     Patient will complete toilet transfer with mod I  Comment:    Pt will perform item retrieval mod I 1x  Comment:     Comment:         Goals  on: 3/21/17  Frequency:1 more session

## 2017-03-14 NOTE — PLAN OF CARE
MUSCULOSKELETAL - ADULT    • Return mobility to safest level of function Progressing    • Maintain proper alignment of affected body part Progressing    WBAT to LLE, pt oob to chair with 1 assist. Tolerated activity/transfer well.  Overbed trapeze appliedto

## 2017-03-14 NOTE — PROGRESS NOTES
[facility]    Progress Note    Nataliia Shoulders Patient Status:  Inpatient    1955 MRN [id number]   Location [facility] 4W/SW/SE Attending Chauncey Rodriguez MD   Hosp Day # 1 PCP Monet Abrams MD     Subjective:   Subjective: 03/14/2017   * 03/14/2017   CA 8.1* 03/14/2017   ALB 3.9 03/03/2017   ALKPHO 72 03/03/2017   BILT 1.2 03/03/2017   TP 6.7 03/03/2017   AST 20 03/03/2017   ALT 23 03/03/2017   INR 1.1 03/14/2017   PSA 0.6 03/03/2017       Xr Knee (1 Or 2 Views), Left

## 2017-03-14 NOTE — PROGRESS NOTES
[facility]    Progress Note    Flor Marroquin Patient Status:  Inpatient    1955 MRN [id number]   [facility] 4W/SW/SE Attending Dale Lopez MD   Hosp Day # 1 PCP Meg Edwards MD       Subjective:   Fatuma Prieto patella. * A drain is seen in place. * No obvious complication. Assessment and Plan:     Primary osteoarthritis of left knee  S/p left TKA by Dr. Bety Araujo. Pain increased as a tightness 4/10 starting at 1AM today.   Worse trying to bend the left

## 2017-03-15 PROCEDURE — 97535 SELF CARE MNGMENT TRAINING: CPT

## 2017-03-15 PROCEDURE — 97110 THERAPEUTIC EXERCISES: CPT

## 2017-03-15 PROCEDURE — 85025 COMPLETE CBC W/AUTO DIFF WBC: CPT | Performed by: NURSE PRACTITIONER

## 2017-03-15 PROCEDURE — 85610 PROTHROMBIN TIME: CPT | Performed by: ORTHOPAEDIC SURGERY

## 2017-03-15 PROCEDURE — 80048 BASIC METABOLIC PNL TOTAL CA: CPT | Performed by: NURSE PRACTITIONER

## 2017-03-15 PROCEDURE — 97116 GAIT TRAINING THERAPY: CPT

## 2017-03-15 PROCEDURE — 83735 ASSAY OF MAGNESIUM: CPT | Performed by: NURSE PRACTITIONER

## 2017-03-15 RX ORDER — WARFARIN SODIUM 10 MG/1
10 TABLET ORAL NIGHTLY
Status: DISCONTINUED | OUTPATIENT
Start: 2017-03-15 | End: 2017-03-18

## 2017-03-15 RX ORDER — CELECOXIB 100 MG/1
100 CAPSULE ORAL 2 TIMES DAILY
Status: DISCONTINUED | OUTPATIENT
Start: 2017-03-15 | End: 2017-03-18

## 2017-03-15 RX ORDER — PANTOPRAZOLE SODIUM 40 MG/1
40 TABLET, DELAYED RELEASE ORAL
Status: DISCONTINUED | OUTPATIENT
Start: 2017-03-15 | End: 2017-03-18

## 2017-03-15 RX ORDER — 0.9 % SODIUM CHLORIDE 0.9 %
VIAL (ML) INJECTION
Status: COMPLETED
Start: 2017-03-15 | End: 2017-03-15

## 2017-03-15 NOTE — OCCUPATIONAL THERAPY NOTE
OCCUPATIONAL THERAPY TREATMENT NOTE - INPATIENT     Room Number: 411/759-L         Presenting Problem:  (s/p LT TKA)    Problem List  Active Problems:    Primary osteoarthritis of left knee      ASSESSMENT   Pt seen up in chair and practiced LE dressing wi Scale): 44.27  CMS Modifier (G-Code): CJ    FUNCTIONAL TRANSFER ASSESSMENT  Supine to Sit : Not tested  Sit to Stand: Minimum assistance    Toilet Transfer: min assist  Shower Transfer: NT  Chair Transfer: min assist   Car Transfer: NT    Bedroom Mobility:

## 2017-03-15 NOTE — PROGRESS NOTES
[facility]    Progress Note    Levi Moran Patient Status:  Inpatient    1955 MRN [id number]   Location [facility] 4W/SW/SE Attending Karen Kearns MD   Hosp Day # 2 PCP Al Kingston MD       Subjective:   Ting Palmer 72 hours. Recent Labs   Lab  03/14/17   0555  03/15/17   0555   INR  1.1  1.2         Xr Knee (1 Or 2 Views), Left (cpt=73560)    3/13/2017  CONCLUSION:  * Total knee arthroplasty is identified.  * The prostheses are well seated in the femur, tibia and p

## 2017-03-15 NOTE — HOME CARE LIAISON
PATIENT IS 62YEAR OLD MALE S/P L TKA. MET WITH PATIENT AT BEDSIDE TO DISCUSS [address]. PATIENT LIVES ALONE BUT HAS SOME ASSISTANCE WITH FAMILY WHEN NEEDED. PATIENT WILL NEED WALKER IN THE HOME. PATIENT IS AGREEABLE WITH [facility] RN/PT.

## 2017-03-15 NOTE — PROGRESS NOTES
ORTHO SURG:  LATE ENTRY FOR 3/14/17. PATIENT WAS SEEN AFTER AM P.T. VISIT. PO#1  AFEB. AM LABS: INR  = 1.1, WBC = 11,500, H/H = 11.5 / 33.5, PLATELETS = 451,966. HEMOVAC OUTPUT (TOTAL) = 180 ML, 80 ML OVER \"11 - 7\" SHIFT. PAIN CONTROLLED.   PE: ALERT, NA

## 2017-03-15 NOTE — PHYSICAL THERAPY NOTE
PHYSICAL THERAPY KNEE TREATMENT NOTE - INPATIENT     Room Number: 585/376-P             Presenting Problem: L TKA    Problem List  Active Problems:    Primary osteoarthritis of left knee      ASSESSMENT   Min a for bed mobility and transfer. Pt amb  2  X 5 20 reps   Quad Sets 20 reps 20 reps   Glut Sets 20 reps 20 reps   Hip Abd/Add 20 reps 20 reps   Heel slides 20 reps 20 reps   Saq 0 reps 0 reps   SLR 0 reps 0 reps   Sitting Knee Flexion 0 reps 0 reps   Standing heel/toe raises 0 reps 0 reps   Standing kne

## 2017-03-15 NOTE — PROGRESS NOTES
[facility]    Progress Note    Leonor Thakur Patient Status:  Inpatient    1955 MRN [id number]   Location [facility] 4W/SW/SE Attending Abe West MD   Hosp Day # 2 PCP Iwona Warren MD     Subjective:   Subjective: 117* 03/15/2017   CA 8.1* 03/15/2017   ALB 3.9 03/03/2017   ALKPHO 72 03/03/2017   BILT 1.2 03/03/2017   TP 6.7 03/03/2017   AST 20 03/03/2017   ALT 23 03/03/2017   INR 1.2 03/15/2017   PSA 0.6 03/03/2017   MG 1.8 03/15/2017       Xr Knee (1 Or 2 Views), L

## 2017-03-15 NOTE — PLAN OF CARE
PAIN - ADULT    • Verbalizes/displays adequate comfort level or patient's stated pain goal Progressing    This nurse has been administering pain meds all through the night and pt states his pain is being controlled.     RISK FOR INFECTION - ADULT    • Absen

## 2017-03-16 PROCEDURE — 97530 THERAPEUTIC ACTIVITIES: CPT

## 2017-03-16 PROCEDURE — 97116 GAIT TRAINING THERAPY: CPT

## 2017-03-16 PROCEDURE — 80048 BASIC METABOLIC PNL TOTAL CA: CPT | Performed by: NURSE PRACTITIONER

## 2017-03-16 PROCEDURE — 97535 SELF CARE MNGMENT TRAINING: CPT

## 2017-03-16 PROCEDURE — 97110 THERAPEUTIC EXERCISES: CPT

## 2017-03-16 PROCEDURE — 85610 PROTHROMBIN TIME: CPT | Performed by: ORTHOPAEDIC SURGERY

## 2017-03-16 RX ORDER — GABAPENTIN 300 MG/1
300 CAPSULE ORAL 3 TIMES DAILY
Status: DISCONTINUED | OUTPATIENT
Start: 2017-03-16 | End: 2017-03-18

## 2017-03-16 NOTE — OCCUPATIONAL THERAPY NOTE
OCCUPATIONAL THERAPY TREATMENT NOTE - INPATIENT     Room Number: 158/115-T         Presenting Problem:  (s/p LT TKA)    Problem List  Active Problems:    Primary osteoarthritis of left knee      ASSESSMENT   Pt seen up in chair and performed LE dressing wi grooming such as brushing teeth?: None  -   Eating meals?: None    AM-PAC Score:  Score: 22  Approx Degree of Impairment: 25.8%  Standardized Score (AM-PAC Scale): 47.1  CMS Modifier (G-Code): CJ    FUNCTIONAL TRANSFER ASSESSMENT  Supine to Sit : Not teste

## 2017-03-16 NOTE — PHYSICAL THERAPY NOTE
PHYSICAL THERAPY KNEE TREATMENT NOTE - INPATIENT     Room Number: 001/137-X             Presenting Problem: L TKA    Problem List  Active Problems:    Primary osteoarthritis of left knee      ASSESSMENT   Min a for bed mobility and transfer. Pt amb  2  X 6 Minimum assistance  Comment : 4    Additional Information:     Exercises AM Session PM Session   Ankle Pumps   20 reps 20 reps   Quad Sets 20 reps 20 reps   Glut Sets 20 reps 20 reps   Hip Abd/Add 20 reps 20 reps   Heel slides 20 reps 20 reps   Saq 0 reps

## 2017-03-16 NOTE — PROGRESS NOTES
ORTHO SURG: PO#3  Tmax  = 99.8. AM LABS: INR = 1.4. PATIENT WAS SEEN LATE AM TODAY AND AGAIN THIS PM.  HAD A MUCH BETTER PM VISIT WITH P.T. 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   NO POST-OP BM YE

## 2017-03-16 NOTE — PROGRESS NOTES
[facility]    Progress Note    Shabana Seats Patient Status:  Inpatient    1955 MRN [id number]   Location [facility] 4W/SW/SE Attending Ericka Trevino MD   Hosp Day # 3 PCP Tomas Andrews MD     Subjective:   Subjective: 4.3   CL  103  101  103   CO2  26  27  31                   Chevy Peterson NP  3/16/2017

## 2017-03-16 NOTE — PROGRESS NOTES
ORTHO SURG:  PO#2  AFEB. AM LABS: INR = 1.2,   WBC = 10,500, H/H = 12.2 / 35.4, PLATELETS = 292,331. PATIENT SEEN LATE AM TODAY. HE C/O INCREASED PAIN OF LEFT LEG. PE: UP IN CHAIR , ALERT, NAD, MOD.  SWELLING OF LEFT LEG, ENTIRE DRESSING REMOVED,  LEFT KN

## 2017-03-16 NOTE — PROGRESS NOTES
[facility]    Progress Note    Mancil Cords Patient Status:  Inpatient    1955 MRN [id number]   Location [facility] 4W/SW/SE Attending Ya Alvarez MD   Hosp Day # 3 PCP Fish Wrae MD       Subjective:   Shonna Navarro input(s): LIP, CAITLIN in the last 72 hours. No results for input(s): TROP, CK in the last 72 hours.     Recent Labs   Lab  03/14/17   0555  03/15/17   0555  03/16/17   0624   INR  1.1  1.2  1.4*                   Assessment and Plan:     Primary osteoarthri

## 2017-03-16 NOTE — PLAN OF CARE
MUSCULOSKELETAL - ADULT    • Return mobility to safest level of function Adequate for Discharge    • Maintain proper alignment of affected body part Adequate for Discharge        PAIN - ADULT    • Verbalizes/displays adequate comfort level or patient's sta

## 2017-03-17 PROCEDURE — 85027 COMPLETE CBC AUTOMATED: CPT | Performed by: NURSE PRACTITIONER

## 2017-03-17 PROCEDURE — 85610 PROTHROMBIN TIME: CPT | Performed by: INTERNAL MEDICINE

## 2017-03-17 PROCEDURE — 97116 GAIT TRAINING THERAPY: CPT

## 2017-03-17 PROCEDURE — 97530 THERAPEUTIC ACTIVITIES: CPT

## 2017-03-17 PROCEDURE — 97535 SELF CARE MNGMENT TRAINING: CPT

## 2017-03-17 PROCEDURE — 80048 BASIC METABOLIC PNL TOTAL CA: CPT | Performed by: NURSE PRACTITIONER

## 2017-03-17 PROCEDURE — 97110 THERAPEUTIC EXERCISES: CPT

## 2017-03-17 RX ORDER — POLYETHYLENE GLYCOL 3350 17 G/17G
17 POWDER, FOR SOLUTION ORAL DAILY
Qty: 1 EACH | Refills: 0 | Status: SHIPPED | OUTPATIENT
Start: 2017-03-17

## 2017-03-17 RX ORDER — CELECOXIB 100 MG/1
100 CAPSULE ORAL 2 TIMES DAILY
Qty: 60 CAPSULE | Refills: 0 | Status: SHIPPED | OUTPATIENT
Start: 2017-03-17

## 2017-03-17 RX ORDER — GABAPENTIN 300 MG/1
300 CAPSULE ORAL 3 TIMES DAILY
Qty: 90 CAPSULE | Refills: 0 | Status: SHIPPED | OUTPATIENT
Start: 2017-03-17

## 2017-03-17 RX ORDER — PSEUDOEPHEDRINE HCL 30 MG
100 TABLET ORAL 2 TIMES DAILY
Qty: 60 CAPSULE | Refills: 0 | Status: SHIPPED | OUTPATIENT
Start: 2017-03-17

## 2017-03-17 RX ORDER — HYDROCODONE BITARTRATE AND ACETAMINOPHEN 7.5; 325 MG/1; MG/1
1 TABLET ORAL EVERY 6 HOURS PRN
Qty: 30 TABLET | Refills: 0 | Status: SHIPPED | OUTPATIENT
Start: 2017-03-17

## 2017-03-17 RX ORDER — WARFARIN SODIUM 5 MG/1
10 TABLET ORAL NIGHTLY
Qty: 60 TABLET | Refills: 1 | Status: SHIPPED | OUTPATIENT
Start: 2017-03-17

## 2017-03-17 NOTE — PROGRESS NOTES
[facility]    Progress Note    Paulette Henley Patient Status:  Inpatient    1955 MRN [id number]   Location [facility] 4W/SW/SE Attending Alirio Holland MD   Hosp Day # 4 PCP Mahi Henson MD     Subjective:   Subjective: >60   GFRNAA  >60  >60  >60   CA  8.1*  8.5  8.1*   NA  133*  139  137   K  4.1  4.3  4.1   CL  101  103  103   CO2  27  31  28                   Charleen Guy NP  3/17/2017

## 2017-03-17 NOTE — PAYOR COMM NOTE
Progress Notes by Bob Bird MD at 3/15/2017  7:32 PM      Author: Bob Bird MD Service: (none) Author Type: Physician     Filed: 3/15/2017  7:42 PM Note Time: 3/15/2017  7:32 PM Status: Signed     : Bob Bird MD (Physician)       Maico Miramontes and cooperative. Sittign in a chair. Eyes: conjunctivae/corneas clear. PERRL, EOM's intact. Throat: lips, mucosa, and tongue normal; teeth and gums normal  Pulmonary:  clear to auscultation bilaterally.  No wheezes,rales or rhonchi.    Cardiovascular: S1, 3/16/2017  7:30 AM      Author: MICHELLE Glover Service: (none) Author Type: MICHELLE     Filed: 3/16/2017  9:11 AM Note Time: 3/16/2017  7:30 AM Status: Signed     : MICHELLE Glover (MICHELLE)     [facility]    Progress Note      34.4    RDW   13.2   13.3    WBC   11.5*   10.5    PLT   732   162            Recent Labs    Lab  03/14/17   6525   03/15/17   0251   03/16/17   0624    GLU   120*   117*   119*    BUN   16   12   17    CREATSERUM   0.95   0.98   0.95    GFRAA   >60   >60

## 2017-03-17 NOTE — PAYOR COMM NOTE
Milena Sanchez #[id number]    Admission Info: Inpatient (Adm: 03/13/17)   Hospital Account: [de-identified]    Description: 62year old M   Primary Service: Surgical   Unit Info: Darian Newell [address] [facility]             Admission Orders        ADMIT TO INPATIENT [[id number]] The patient has had increasing pain and dysfunction to left knee for many months.  The patient has seen Dr. Zee Herzog as an outpatient, and is now experiencing debilitating pain such that he is unable to work out at [employer].     PAST MEDICAL HISTORY:  Sig symptoms.  His activity is limited due to left knee discomfort.      PHYSICAL EXAMINATION:     GENERAL:  This is a hefty white male who is in no acute distress.     VITAL SIGNS:  Blood pressure is 136/80, pulse 72.  BMI is 36.63.  Respiration 16, patient i 18:50:44  [facility]   [id number]  PMB/    cc:   Ruben Ferguson MD            Signed by Maegan Chapin MD on 3/14/2017 12:17 PM            Interval H&P Note by Jennifer Temple MD at 3/13/2017  6:39 AM      Author: Jennifer Temple MD Service: (none) Latasha Tierney  3:15 PM Note Time: 3/10/2017  3:15 PM Status: Deleted by Interface, Emg Tx In at 3/10/2017  3:15 PM     : Emy Yoder (UC/PCT)

## 2017-03-17 NOTE — PROGRESS NOTES
[facility] - [facility]    Progress Note    Diana Ayers Patient Status:  Inpatient    1955 MRN [id number]   Location [facility] 4W/SW/SE Attending Arthea Schwab, MD   Hosp Day # 4 PCP José Miguel Snyder MD       Subjective:   Marvin Hernandez in the last 72 hours. No results for input(s): TROP, CK in the last 72 hours.     Recent Labs   Lab  03/15/17   0555  03/16/17   0624  03/17/17   0535   INR  1.2  1.4*  1.8*                   Assessment and Plan:     Primary osteoarthritis of left knee

## 2017-03-17 NOTE — PAYOR COMM NOTE
Progress Notes by Josie Amaro MD at 3/16/2017  6:42 PM      Author: Josie Amaro MD Service: (none) Author Type: Physician     Filed: 3/16/2017  6:53 PM Note Time: 3/16/2017  6:42 PM Status: Signed     : Josie Amaro MD (Physician)       Anup Shah mucosa, and tongue normal; teeth and gums normal  Pulmonary:  clear to auscultation bilaterally.  No wheezes,rales or rhonchi.    Cardiovascular: S1, S2 normal, no murmur, click, rub or gallop, regular rate and rhythm  Abdominal: soft, non-tender; bowel charity Note  Mahesh Leija Patient Status:  Inpatient    Jennifer Odell [birth year]  MRN  [id number]    [facility] 4W/SW/SE  Attending  Moe Almeida MD    Hosp Day #  4  PCP  Wilbert Hobbs MD          Subjective:    Guy Gates is a(n) 62 yea MG   1.8    --     --         No results for input(s): LIP, CAITLIN in the last 72 hours.     No results for input(s): TROP, CK in the last 72 hours.      Recent Labs    Lab  03/15/17   0555   03/16/17   0624   03/17/17   0535    INR   1.2   1.4*   1.8*

## 2017-03-17 NOTE — DISCHARGE PLANNING
MD orders received regarding HHC and CPM for home. Referral and update has been provided to [facility]. CPM has been ordered through [facility]. Both agencies are aware of discharge plan for tomorrow 3/18.     [facility] [phone]    Cinda Lou

## 2017-03-17 NOTE — PROGRESS NOTES
ORTHO SURG: PO#4  AFEB. AM LABS: INR = 1.8, WBC = 10,000, H/H =- 11.3 / 32.5, PLATELETS = 295,913. PAIN BETTER CONTROLLED THAN YESTERDAY, PATIENT NOTES IMPROVED MOBILITY, NO POST - OP BM.    PE: UP IN CHAIR, ALERT, NAD, LEFT KNEE DRESSING WITHOUT INTERVAL C

## 2017-03-17 NOTE — PLAN OF CARE
DISCHARGE PLANNING    • Discharge to home or other facility with appropriate resources Progressing        HEMATOLOGIC - ADULT    • Free from bleeding injury Progressing        Impaired Functional Mobility    • Achieve highest/safest level of mobility/gait

## 2017-03-17 NOTE — PAYOR COMM NOTE
Progress Notes by Al Pickens MD at 3/13/2017  4:48 PM      Author: Al Pickens MD Service: (none) Author Type: Physician     Filed: 3/13/2017  4:53 PM Note Time: 3/13/2017  4:48 PM Status: Signed     : Al Pickens MD (Physician)       Obdulio Vega    Neurologic: A&A.  O'd x 3.  Cr NN 2-7 and 9-12 intact.  Motor +5/5 strength to UE andLE's with limited exam to the LLE as it is wrapped.  Sensory exam intact to soft touch.           Results:      No results for input(s): RBC, HGB, HCT, MCV, MCH, MCHC, R after afternoon therapy/ tolerated therapy/ pain after        Objective:    Blood pressure 112/69, pulse 61, temperature 97.5 °F (36.4 °C), temperature source Oral, resp. rate 18, height 5' 8\" (1.727 m), weight 227 lb (102.967 kg), SpO2 97 %.     Scheduled 03/03/2017    INR  1.1  03/14/2017    PSA  0.6  03/03/2017        Xr Knee (1 Or 2 Views), Left (cpt=73560)    3/13/2017  CONCLUSION:  *     Total knee arthroplasty is identified. *     The prostheses are well seated in the femur, tibia and patella.  *       rhythm  Abdominal: soft, non-tender; bowel sounds normal; no masses,  no organomegaly  Extremities: atraumatic, no cyanosis. Trace edema at the distal calves/ankles.  Very tender posterior left calf w/o increased warmth or significant swelling.  Drain is ou

## 2017-03-17 NOTE — PHYSICAL THERAPY NOTE
PHYSICAL THERAPY KNEE TREATMENT NOTE - INPATIENT     Room Number: 386/897-O             Presenting Problem: L TKA    Problem List  Active Problems:    Primary osteoarthritis of left knee      ASSESSMENT   Min a for bed mobility and transfer. Pt amb  2  X 8 assistance  Comment : 12    Additional Information:     Exercises AM Session PM Session   Ankle Pumps   20 reps 20 reps   Quad Sets 20 reps 20 reps   Glut Sets 20 reps 20 reps   Hip Abd/Add 20 reps 20 reps   Heel slides 20 reps 20 reps   Saq 0 reps 0 reps

## 2017-03-17 NOTE — OCCUPATIONAL THERAPY NOTE
OCCUPATIONAL THERAPY TREATMENT NOTE - INPATIENT     Room Number: 537/295-U         Presenting Problem:  (s/p LT TKA)    Problem List  Active Problems:    Primary osteoarthritis of left knee      ASSESSMENT   Pt seen up in chair and able to dress self with grooming such as brushing teeth?: None  -   Eating meals?: None    AM-PAC Score:  Score: 22  Approx Degree of Impairment: 25.8%  Standardized Score (AM-PAC Scale): 47.1  CMS Modifier (G-Code): CJ    FUNCTIONAL TRANSFER ASSESSMENT  Supine to Sit : Not teste

## 2017-03-17 NOTE — PLAN OF CARE
DISCHARGE PLANNING    • Discharge to home or other facility with appropriate resources Progressing    Plan is home with home health friday    HEMATOLOGIC - ADULT    • Free from bleeding injury Progressing    Coumadin therapy in place.  No s/s of bleeding no

## 2017-03-18 VITALS
OXYGEN SATURATION: 96 % | HEIGHT: 68 IN | TEMPERATURE: 98 F | HEART RATE: 76 BPM | SYSTOLIC BLOOD PRESSURE: 145 MMHG | WEIGHT: 227 LBS | BODY MASS INDEX: 34.4 KG/M2 | RESPIRATION RATE: 20 BRPM | DIASTOLIC BLOOD PRESSURE: 86 MMHG

## 2017-03-18 PROCEDURE — 97110 THERAPEUTIC EXERCISES: CPT

## 2017-03-18 PROCEDURE — 97530 THERAPEUTIC ACTIVITIES: CPT

## 2017-03-18 PROCEDURE — 97116 GAIT TRAINING THERAPY: CPT

## 2017-03-18 PROCEDURE — 85610 PROTHROMBIN TIME: CPT | Performed by: INTERNAL MEDICINE

## 2017-03-18 NOTE — PROGRESS NOTES
[facility]    Progress Note    Keanu Curry Patient Status:  Inpatient    1955 MRN [id number]   Location [facility] 4W/SW/SE Attending Cameron Chao MD   Hosp Day # 5 PCP Hosea Mason MD       Subjective:   Emil Robledo INR therapeutic. Home today w/ HHC. Next INR on Monday. Remain on 10mg/d coumadin.         Rehana Maldonado MD  3/18/2017

## 2017-03-18 NOTE — PHYSICAL THERAPY NOTE
PHYSICAL THERAPY KNEE TREATMENT NOTE - INPATIENT     Room Number: 051/262-I             Presenting Problem: L TKA    Problem List  Active Problems:    Primary osteoarthritis of left knee      ASSESSMENT   Min /CGA for bed mobility and transfer. Pt amb  2 Supervision  Distance (ft): 2 x 75  Assistive Device: Rolling walker     Stoop/Curb Assistance:  (CGA/SBa)  Comment : 12    Additional Information:     Exercises AM Session PM Session   Ankle Pumps   20 reps 20 reps   Quad Sets 20 reps 20 reps   Glut Sets

## 2017-03-18 NOTE — PROGRESS NOTES
[facility]    Progress Note    Stoney Short Patient Status:  Inpatient    1955 MRN [id number]   Location [facility] 4W/SW/SE Attending Chiqui Castellano MD   [address] Day # 5 PCP Jessica Somers MD     Date of Admission:  3/13/201 tablet 1 tablet Oral Q4H PRN   hydrocodone-acetaminophen (NORCO) 7.5-325 MG per tab 1 tablet 1 tablet Oral Q4H PRN   HYDROcodone-acetaminophen (NORCO) 5-325 MG per tab 1 tablet 1 tablet Oral Q4H PRN   [DISCONTINUED] gabapentin (NEURONTIN) cap 200 mg 200 mg PRN   [] ondansetron HCl (ZOFRAN) injection 4 mg 4 mg Intravenous Once PRN   [] Prochlorperazine Edisylate (COMPAZINE) injection 5 mg 5 mg Intravenous Once PRN   [] haloperidol lactate (HALDOL) 5 MG/ML injection 0.5 mg 0.5 mg Intraveno Rheumatoid arthritis ([facility] [address]    • Anxiety state       Surgical History:      Past Surgical History    HERNIA SURGERY      Comment UMBILICAL    TOTAL KNEE REPLACEMENT Right     TOTAL KNEE REPLACEMENT Left       Social History:    Smoking Status: Former Smoker well in physical therapy this morning. Would prefer to have an early p.m. session as well prior to his discharge. Follow-up instructions per Dr. Anita Graves. Digital transcription software was utilized to produce this note.  The note was proofread for

## 2017-03-18 NOTE — DISCHARGE SUMMARY
[facility]    PATIENT'S NAME: Desiree Valencia   ATTENDING PHYSICIAN: Kashmir Oliveira MD   PATIENT ACCOUNT#:   [de-identified]    LOCATION:  [address] [place] [address] #:   [id number]       YOB: 1955  ADMISSION DATE:       03/13/ Patient is to follow up with Dr. Genaro Gallegos within 1 week and with me within 1 week and call if there is any change in status. Dictated By Vikas Doss MD  d: 03/18/2017 09:24:22  t: 03/18/2017 09:36:31  [facility] [id number]  PMB/    cc: Tammy Christopher Muscjami

## 2017-07-29 NOTE — OPERATIVE REPORT
[facility]    PATIENT'S NAME: Finn Harrison   ATTENDING PHYSICIAN: Kashmir Oliveira MD   OPERATING PHYSICIAN: Kashmir Oliveira MD   PATIENT ACCOUNT#:   [de-identified]    LOCATION:  [facility] [address] #:   [id number]       DATE OF BIRTH anesthetic was administered by Dr. Wheeler. The patient was positioned supine. General anesthesia with LMA was established. A Yo urinary catheter was placed by operating room nursing staff using standard sterile preparation technique.   Cast padding an osteoarthritic involvement was noted. The anterior horn and body of the medial meniscus was excised, and in doing so, a deep portion of the medial collateral ligament was released by sharp technique and with the curved a 3/4-inch osteotome.   The ACL was r the sizing guide medially and laterally. The 67.5 mm femoral cutting block was placed and impacted to flush fit. Referencing through the anterior capture slot showed an optimal anterior condyle resection to be anticipated.   The guide was further secured anterior proximal tibia referenced final position for the tibial component. All trial components were removed. The knee was brought to flexion with standard retractor placement. A 75 mm tibial template was positioned for final placement of the implant. collection system. Arthrotomy closure was done with interrupted #1 Ethibond sutures. Deep subcutaneous repair with sutures.   These were placed initially at the midpatellar level followed by placement of the knee in flexion, and completion of closure in t

## 2017-11-09 ENCOUNTER — HOSPITAL ENCOUNTER (OUTPATIENT)
Dept: GENERAL RADIOLOGY | Age: 62
Discharge: HOME | End: 2017-11-09
Attending: ORTHOPAEDIC SURGERY
Payer: COMMERCIAL

## 2017-11-09 DIAGNOSIS — R60.9 SWELLING: ICD-10-CM

## 2017-11-09 DIAGNOSIS — M25.562 PAIN IN LEFT KNEE: ICD-10-CM

## 2017-11-09 PROCEDURE — 73564 X-RAY EXAM KNEE 4 OR MORE: CPT | Performed by: ORTHOPAEDIC SURGERY

## 2017-11-30 ENCOUNTER — HOSPITAL ENCOUNTER (OUTPATIENT)
Dept: ULTRASOUND IMAGING | Facility: HOSPITAL | Age: 62
Discharge: HOME | End: 2017-11-30
Attending: ORTHOPAEDIC SURGERY
Payer: COMMERCIAL

## 2017-11-30 DIAGNOSIS — M25.562 POSTERIOR KNEE PAIN, LEFT: ICD-10-CM

## 2017-11-30 PROCEDURE — 76882 US LMTD JT/FCL EVL NVASC XTR: CPT | Performed by: ORTHOPAEDIC SURGERY

## 2017-12-27 ENCOUNTER — HOSPITAL ENCOUNTER (OUTPATIENT)
Dept: ULTRASOUND IMAGING | Facility: HOSPITAL | Age: 62
Discharge: HOME | End: 2017-12-27
Attending: ORTHOPAEDIC SURGERY
Payer: COMMERCIAL

## 2017-12-27 DIAGNOSIS — R22.42 MASS OF KNEE, LEFT: ICD-10-CM

## 2017-12-27 PROCEDURE — 76882 US LMTD JT/FCL EVL NVASC XTR: CPT | Performed by: ORTHOPAEDIC SURGERY

## 2018-03-10 ENCOUNTER — HOSPITAL ENCOUNTER (OUTPATIENT)
Dept: GENERAL RADIOLOGY | Age: 63
Discharge: HOME | End: 2018-03-10
Attending: INTERNAL MEDICINE
Payer: COMMERCIAL

## 2018-03-10 DIAGNOSIS — R05.9 COUGH: ICD-10-CM

## 2018-03-10 DIAGNOSIS — R07.9 CHEST PAIN, UNSPECIFIED: ICD-10-CM

## 2018-03-10 PROCEDURE — 71100 X-RAY EXAM RIBS UNI 2 VIEWS: CPT | Performed by: INTERNAL MEDICINE

## 2018-03-10 PROCEDURE — 71046 X-RAY EXAM CHEST 2 VIEWS: CPT | Performed by: INTERNAL MEDICINE

## 2018-03-28 ENCOUNTER — HOSPITAL ENCOUNTER (EMERGENCY)
Facility: HOSPITAL | Age: 63
Discharge: HOME | End: 2018-03-29
Payer: COMMERCIAL

## 2018-03-28 ENCOUNTER — APPOINTMENT (OUTPATIENT)
Dept: GENERAL RADIOLOGY | Facility: HOSPITAL | Age: 63
End: 2018-03-28
Payer: COMMERCIAL

## 2018-03-28 VITALS
TEMPERATURE: 98 F | HEART RATE: 96 BPM | DIASTOLIC BLOOD PRESSURE: 109 MMHG | RESPIRATION RATE: 18 BRPM | OXYGEN SATURATION: 96 % | SYSTOLIC BLOOD PRESSURE: 165 MMHG

## 2018-03-28 DIAGNOSIS — S22.31XA CLOSED FRACTURE OF ONE RIB OF RIGHT SIDE, INITIAL ENCOUNTER: Primary | ICD-10-CM

## 2018-03-28 PROCEDURE — 71101 X-RAY EXAM UNILAT RIBS/CHEST: CPT

## 2018-03-28 PROCEDURE — 99283 EMERGENCY DEPT VISIT LOW MDM: CPT

## 2018-03-28 RX ORDER — LIDOCAINE 50 MG/G
1 PATCH TOPICAL ONCE
Status: DISCONTINUED | OUTPATIENT
Start: 2018-03-28 | End: 2018-03-29

## 2018-03-28 RX ORDER — LIDOCAINE 50 MG/G
1 PATCH TOPICAL EVERY 24 HOURS
Qty: 6 PATCH | Refills: 0 | Status: SHIPPED | OUTPATIENT
Start: 2018-03-28

## 2018-03-28 RX ORDER — HYDROCODONE BITARTRATE AND ACETAMINOPHEN 5; 325 MG/1; MG/1
1-2 TABLET ORAL EVERY 4 HOURS PRN
Qty: 10 TABLET | Refills: 0 | Status: SHIPPED | OUTPATIENT
Start: 2018-03-28 | End: 2018-04-04

## 2018-03-29 NOTE — ED NOTES
Patient is a/o and does not appear to be in distress- c/o sharp pain to right anterior rib care/chest after hard cough this evening. Admits to breaking 9th posterior, right rib first week in March after coughing hard. Denies any trauma.

## 2018-03-29 NOTE — ED PROVIDER NOTES
Patient Seen in: [facility] AND [facility] Emergency Department    History   Patient presents with:  Trauma (cardiovascular, musculoskeletal)    Stated Complaint: rib pain    HPI    41-year-old male presents for right rib pain.   Patient diagnosed with right rib rate, regular rhythm, normal heart sounds and intact distal pulses. Pulmonary/Chest: Effort normal and breath sounds normal. No respiratory distress. He exhibits tenderness.    Tender to palpation right posterior anterior lateral chest wall   Abdominal: R-0    lidocaine 5 % External Patch  Place 1 patch onto the skin daily. , Normal, Disp-6 patch, R-0

## 2018-09-20 ENCOUNTER — HOSPITAL ENCOUNTER (OUTPATIENT)
Dept: GENERAL RADIOLOGY | Age: 63
Discharge: HOME | End: 2018-09-20
Attending: ORTHOPAEDIC SURGERY
Payer: COMMERCIAL

## 2018-09-20 DIAGNOSIS — M25.569 KNEE PAIN: ICD-10-CM

## 2018-09-20 PROCEDURE — 73560 X-RAY EXAM OF KNEE 1 OR 2: CPT | Performed by: ORTHOPAEDIC SURGERY

## 2018-09-20 PROCEDURE — 73564 X-RAY EXAM KNEE 4 OR MORE: CPT | Performed by: ORTHOPAEDIC SURGERY

## 2018-10-25 ENCOUNTER — HOSPITAL ENCOUNTER (OUTPATIENT)
Dept: NUTRITION | Facility: HOSPITAL | Age: 63
Discharge: HOME | End: 2018-10-25
Attending: INTERNAL MEDICINE
Payer: COMMERCIAL

## 2018-10-25 DIAGNOSIS — R63.4 WEIGHT LOSS: ICD-10-CM

## 2018-10-25 PROCEDURE — 97802 MEDICAL NUTRITION INDIV IN: CPT | Performed by: DIETITIAN, REGISTERED

## 2018-10-26 NOTE — PROGRESS NOTES
Nutrition Assessment    Corinne Dennis is a 59year old male. Referred by:  Attending  Referring Physician Name: Ismael Reese Nutrition Therapy Comment: Obesity  Visit Information: Initial Visit 10/25/18    ANTHROPOMETRICS  HT of what to eat and accountability to help his to stay on track. Discussed a calorie controlled diet to promote weight loss.     PHYSICAL ACTIVITY  · Type: other: strength training, cardio  · Duration: 120-150 minutes  · Frequency: per week  · Physical Asses

## 2018-11-10 ENCOUNTER — LAB ENCOUNTER (OUTPATIENT)
Dept: LAB | Age: 63
End: 2018-11-10
Attending: INTERNAL MEDICINE
Payer: COMMERCIAL

## 2018-11-10 DIAGNOSIS — Z13.220 SCREENING CHOLESTEROL LEVEL: Primary | ICD-10-CM

## 2018-11-10 PROCEDURE — 80061 LIPID PANEL: CPT

## 2018-11-10 PROCEDURE — 84443 ASSAY THYROID STIM HORMONE: CPT

## 2018-11-10 PROCEDURE — 85025 COMPLETE CBC W/AUTO DIFF WBC: CPT

## 2018-11-10 PROCEDURE — 36415 COLL VENOUS BLD VENIPUNCTURE: CPT

## 2018-11-10 PROCEDURE — 80053 COMPREHEN METABOLIC PANEL: CPT

## 2018-11-29 ENCOUNTER — HOSPITAL ENCOUNTER (OUTPATIENT)
Dept: NUTRITION | Facility: HOSPITAL | Age: 63
Discharge: HOME | End: 2018-11-29
Attending: INTERNAL MEDICINE
Payer: COMMERCIAL

## 2018-11-29 DIAGNOSIS — E66.9 OBESITY: ICD-10-CM

## 2018-11-29 PROCEDURE — 97803 MED NUTRITION INDIV SUBSEQ: CPT | Performed by: DIETITIAN, REGISTERED

## 2018-11-29 NOTE — PROGRESS NOTES
Nutrition Assessment    Keanu Curry is a 59year old male. Referred by:  Attending  Referring Physician Name: Sandra Hayden    Assessment     Medical Nutrition Therapy Comment: Obesity  Visit Information: Follow-up Visit 11/29/18    ANTHROPOMETRICS possible meal delivery plans or food prep businesses. His goal is to continue with his weight loss, and get to his goal of 165 pounds.     PHYSICAL ACTIVITY  · Type: other: strength and cardio  · Duration: 60 minutes  · Frequency:4 days per week  · Physical

## 2019-01-03 ENCOUNTER — HOSPITAL ENCOUNTER (OUTPATIENT)
Dept: NUTRITION | Facility: HOSPITAL | Age: 64
Discharge: HOME | End: 2019-01-03
Attending: INTERNAL MEDICINE
Payer: COMMERCIAL

## 2019-01-03 DIAGNOSIS — E66.9 OBESITY (BMI 30-39.9): ICD-10-CM

## 2019-01-03 PROCEDURE — 97803 MED NUTRITION INDIV SUBSEQ: CPT | Performed by: DIETITIAN, REGISTERED

## 2019-01-03 NOTE — PROGRESS NOTES
Nutrition Assessment    Nataliia Erwin is a 59year old male. Referred by:  Attending  Referring Physician Name: Vu Hardy MD    Assessment     Medical Nutrition Therapy Comment: Obesity  Visit Information: Follow-up Visit 1/3/19    ANTHROPOMETRICS Assessment: Meets goal of 150-200 minutes a week of exercise for weight loss.     Nutrition Diagnosis: Obesity as evidenced by BMI  30-39    Intervention     Nutrition Education: Result Interpretation  Nutrition/Diet Handouts Given: Other Handouts Provided:

## 2019-01-31 ENCOUNTER — HOSPITAL ENCOUNTER (OUTPATIENT)
Dept: NUTRITION | Facility: HOSPITAL | Age: 64
Discharge: HOME | End: 2019-01-31
Attending: INTERNAL MEDICINE
Payer: COMMERCIAL

## 2019-01-31 DIAGNOSIS — E66.9 OBESITY (BMI 35.0-39.9 WITHOUT COMORBIDITY): ICD-10-CM

## 2019-01-31 PROCEDURE — 97803 MED NUTRITION INDIV SUBSEQ: CPT | Performed by: DIETITIAN, REGISTERED

## 2019-01-31 NOTE — PROGRESS NOTES
Kuldeep Ayon is a 59year old male.     Referred by:  Attending  Referring Physician Name: Yusef Begum MD     Assessment      Medical Nutrition Therapy Comment: Obesity  Visit Information: Follow-up Visit 1/31/19     ANTHROPOMETRICS  HT: 5' 7\"  WT: 2 150-200 minutes a week of exercise for weight loss.     Nutrition Diagnosis: Obesity as evidenced by BMI  30-39     Intervention      Nutrition Education: Result Interpretation  Nutrition/Diet Handouts Given: Other Handouts Provided: Goal sheet        NUTR

## 2019-04-04 ENCOUNTER — HOSPITAL ENCOUNTER (OUTPATIENT)
Dept: NUTRITION | Facility: HOSPITAL | Age: 64
Discharge: HOME | End: 2019-04-04
Attending: INTERNAL MEDICINE
Payer: COMMERCIAL

## 2019-04-04 DIAGNOSIS — R63.4 WEIGHT LOSS: ICD-10-CM

## 2019-04-04 PROCEDURE — 97803 MED NUTRITION INDIV SUBSEQ: CPT | Performed by: DIETITIAN, REGISTERED

## 2019-04-04 NOTE — PROGRESS NOTES
Law Cadet a 59year old male.     Referred by: Attending  Referring Physician Name: Ruben Brunson MD     Assessment      Medical Nutrition Therapy Comment: Obesity  Visit Information: Follow-up Visit 4/4/19     ANTHROPOMETRICS  HT: 5' 7\"  WT: 23 ACTIVITY  · Type: other: stength & cardio  · Duration: 60 minutes  · Frequency: 4 days per week  · Physical Assessment: Meets goal of 150-200 minutes a week of exercise for weight loss.     Nutrition Diagnosis: Obesity as evidenced by BMI  30-39     Interv

## 2019-05-30 ENCOUNTER — HOSPITAL ENCOUNTER (OUTPATIENT)
Dept: NUTRITION | Facility: HOSPITAL | Age: 64
Discharge: HOME | End: 2019-05-30
Attending: INTERNAL MEDICINE
Payer: COMMERCIAL

## 2019-05-30 DIAGNOSIS — R63.4: ICD-10-CM

## 2019-05-30 PROCEDURE — 97803 MED NUTRITION INDIV SUBSEQ: CPT | Performed by: DIETITIAN, REGISTERED

## 2019-05-30 NOTE — PROGRESS NOTES
Nutrition Assessment     Zelalem Martinez is a 59year old male.     Referred by:  Attending  Referring Physician Name: Maddie Hines MD     Assessment      Medical Nutrition Therapy Comment: Obesity  Visit Information: Follow-up Visit 5/30/19     ANTHROPOM a week of exercise for weight loss.     Nutrition Diagnosis: Obesity as evidenced by BMI  30-39     Intervention      Nutrition Education: Result Interpretation  Nutrition/Diet Handouts Given: Other Handouts Provided: Goal sheet        NUTRITION PRESCRIPTI

## 2019-06-27 ENCOUNTER — HOSPITAL ENCOUNTER (OUTPATIENT)
Dept: NUTRITION | Facility: HOSPITAL | Age: 64
Discharge: HOME | End: 2019-06-27
Attending: INTERNAL MEDICINE
Payer: COMMERCIAL

## 2019-06-27 DIAGNOSIS — R63.4 WEIGHT LOSS: ICD-10-CM

## 2019-06-27 PROCEDURE — 97803 MED NUTRITION INDIV SUBSEQ: CPT | Performed by: DIETITIAN, REGISTERED

## 2019-06-27 NOTE — PROGRESS NOTES
Nutrition Assessment     Tyrone Paget a 59year old male.     Referred by: Attending  Referring Physician Name: Ruben Gutierrez MD     Assessment      Medical Nutrition Therapy Comment: Obesity  Visit Information: Follow-up Visit 6/27/19     ANTHROPOM Family Will: Verbalize Understanding     Patient and/or Family Ability to Learn: Retain Information     Readiness to Learn: Motivated     Barriers to Learning: None        6/27/2019  Alaina Lara RD

## 2019-08-22 ENCOUNTER — HOSPITAL ENCOUNTER (OUTPATIENT)
Dept: NUTRITION | Facility: HOSPITAL | Age: 64
Discharge: HOME | End: 2019-08-22
Attending: INTERNAL MEDICINE
Payer: COMMERCIAL

## 2019-08-22 DIAGNOSIS — R63.4 LOSS OF WEIGHT: ICD-10-CM

## 2019-08-22 PROCEDURE — 97803 MED NUTRITION INDIV SUBSEQ: CPT | Performed by: DIETITIAN, REGISTERED

## 2019-08-22 NOTE — PROGRESS NOTES
Nutrition Assessment     Amy Preciado a 62year old male.     Referred by: Attending  Referring Physician Name: Ruben Pat MD     Assessment      Medical Nutrition Therapy Comment: Obesity  Visit Information: Follow-up Visit 8/22/19     ANTHROPOM    Patient and/or Family Will: Verbalize Understanding     Patient and/or Family Ability to Learn: Retain Information     Readiness to Learn: Motivated     Barriers to Learning: None        8/22/2019  Bonita Brown RD

## 2019-11-14 ENCOUNTER — HOSPITAL ENCOUNTER (OUTPATIENT)
Dept: NUTRITION | Facility: HOSPITAL | Age: 64
Discharge: HOME | End: 2019-11-14
Attending: INTERNAL MEDICINE
Payer: COMMERCIAL

## 2019-11-14 DIAGNOSIS — R63.4: ICD-10-CM

## 2019-11-14 PROCEDURE — 97803 MED NUTRITION INDIV SUBSEQ: CPT | Performed by: DIETITIAN, REGISTERED

## 2019-11-14 NOTE — PROGRESS NOTES
Nutrition Assessment     Porter Areli a 62year old male.     Referred by: Attending  Referring Physician Name: Ruben Harden MD     Assessment      Medical Nutrition Therapy Comment: Obesity  Visit Information: Follow-up Visit 11/14/19     ANTHROPO Will: Verbalize Understanding     Patient and/or Family Ability to Learn: Retain Information     Readiness to Learn: Motivated     Barriers to Learning: None        11/14/2019  Floyd Mckeon RD

## 2020-01-09 ENCOUNTER — HOSPITAL ENCOUNTER (OUTPATIENT)
Dept: NUTRITION | Facility: HOSPITAL | Age: 65
Discharge: HOME | End: 2020-01-09
Attending: INTERNAL MEDICINE
Payer: COMMERCIAL

## 2020-01-09 DIAGNOSIS — E66.9 OBESITY (BMI 30-39.9): ICD-10-CM

## 2020-01-09 DIAGNOSIS — R63.4 WEIGHT LOSS: ICD-10-CM

## 2020-01-09 PROCEDURE — 97803 MED NUTRITION INDIV SUBSEQ: CPT | Performed by: DIETITIAN, REGISTERED

## 2020-01-09 NOTE — PROGRESS NOTES
Nutrition Assessment     Jerald Chanda a 62year old male.     Referred by: Attending  Referring Physician Name: Ruben Martinez MD     Assessment      Medical Nutrition Therapy Comment: Obesity  Visit Information: Follow-up Visit 1/9/20     Cande Reddy until goal weight.        Recommendation to MD: Need new order for dietitian treatment  Assessment of Ability/Barriers      Patient and/or Family Will: Verbalize Understanding     Patient and/or Family Ability to Learn: Retain Information     Readiness to

## 2020-02-13 ENCOUNTER — HOSPITAL ENCOUNTER (OUTPATIENT)
Dept: NUTRITION | Facility: HOSPITAL | Age: 65
Discharge: HOME | End: 2020-02-13
Attending: INTERNAL MEDICINE
Payer: COMMERCIAL

## 2020-02-13 DIAGNOSIS — E66.9 OBESITY (BMI 30-39.9): ICD-10-CM

## 2020-02-13 PROCEDURE — 97803 MED NUTRITION INDIV SUBSEQ: CPT | Performed by: DIETITIAN, REGISTERED

## 2020-02-13 NOTE — PROGRESS NOTES
Nutrition Assessment     Reny Carter a 62year old male.     Referred by: Attending  Referring Physician Name: Ruben Manzanares MD     Assessment      Medical Nutrition Therapy Comment: Obesity  Visit Information: Follow-up Visit 2/13/20     ANTHROPOM Alternate high sodium meals with low sodium foods during the week. 2. Start using My[employer]itness[employer] to log all food intake and see if he is meeting his goal calories and macronutrients. 3. Add more non-starchy vegetables: may use fresh or frozen.   4. Current

## 2020-06-20 ENCOUNTER — LAB ENCOUNTER (OUTPATIENT)
Dept: LAB | Age: 65
End: 2020-06-20
Attending: INTERNAL MEDICINE
Payer: COMMERCIAL

## 2020-06-20 DIAGNOSIS — E66.9 OBESITY: Primary | ICD-10-CM

## 2020-06-20 DIAGNOSIS — Z12.5 SCREENING FOR PROSTATE CANCER: ICD-10-CM

## 2020-06-20 PROCEDURE — 87389 HIV-1 AG W/HIV-1&-2 AB AG IA: CPT

## 2020-06-20 PROCEDURE — 36415 COLL VENOUS BLD VENIPUNCTURE: CPT

## 2020-06-20 PROCEDURE — 80061 LIPID PANEL: CPT

## 2020-06-20 PROCEDURE — 85025 COMPLETE CBC W/AUTO DIFF WBC: CPT

## 2020-07-07 ENCOUNTER — HOSPITAL ENCOUNTER (OUTPATIENT)
Dept: NUTRITION | Facility: HOSPITAL | Age: 65
Discharge: HOME | End: 2020-07-07
Attending: INTERNAL MEDICINE
Payer: COMMERCIAL

## 2020-07-07 DIAGNOSIS — E66.9 OBESITY, UNSPECIFIED: ICD-10-CM

## 2020-07-07 PROCEDURE — 97803 MED NUTRITION INDIV SUBSEQ: CPT | Performed by: DIETITIAN, REGISTERED

## 2020-07-07 NOTE — PROGRESS NOTES
Nutrition Assessment     Amy Ozzy a 60year old male.     Referred by: Attending  Referring Physician Name: Ruben Pat MD     Assessment      Medical Nutrition Therapy Comment: Obesity  Visit Information: Follow-up Visit 7/7/20     Roxana Adler    Nutrition Goals:   1. Try Home  Ready to [facility] meals delivery service as an alternative to Freshly. Trace's also meake pre-cooked meals for quick options at night.   2. Start using Myfitnesspal to log all food intake and see if he is meeting his g

## 2020-07-29 ENCOUNTER — LAB ENCOUNTER (OUTPATIENT)
Dept: LAB | Facility: HOSPITAL | Age: 65
End: 2020-07-29
Attending: PREVENTIVE MEDICINE
Payer: COMMERCIAL

## 2020-07-29 ENCOUNTER — TELEPHONE (OUTPATIENT)
Dept: INTERNAL MEDICINE CLINIC | Facility: HOSPITAL | Age: 65
End: 2020-07-29

## 2020-07-29 DIAGNOSIS — Z20.822 SUSPECTED 2019 NOVEL CORONAVIRUS INFECTION: ICD-10-CM

## 2020-07-29 DIAGNOSIS — Z20.822 SUSPECTED 2019 NOVEL CORONAVIRUS INFECTION: Primary | ICD-10-CM

## 2020-07-29 LAB — SARS-COV-2 RNA RESP QL NAA+PROBE: NOT DETECTED

## 2020-08-18 DIAGNOSIS — Z78.9 PARTICIPANT IN HEALTH AND WELLNESS PLAN: Primary | ICD-10-CM

## 2020-08-20 ENCOUNTER — NURSE ONLY (OUTPATIENT)
Dept: LAB | Age: 65
End: 2020-08-20
Attending: PREVENTIVE MEDICINE

## 2020-08-20 DIAGNOSIS — Z78.9 PARTICIPANT IN HEALTH AND WELLNESS PLAN: ICD-10-CM

## 2020-08-20 PROCEDURE — 86769 SARS-COV-2 COVID-19 ANTIBODY: CPT

## 2020-08-21 LAB — SARS-COV-2 IGG SERPLBLD QL IA.RAPID: NEGATIVE

## 2020-08-25 ENCOUNTER — HOSPITAL ENCOUNTER (OUTPATIENT)
Dept: NUTRITION | Facility: HOSPITAL | Age: 65
Discharge: HOME | End: 2020-08-25
Attending: INTERNAL MEDICINE
Payer: COMMERCIAL

## 2020-08-25 DIAGNOSIS — E66.9 OBESITY (BMI 30-39.9): ICD-10-CM

## 2020-08-25 PROCEDURE — 97803 MED NUTRITION INDIV SUBSEQ: CPT | Performed by: DIETITIAN, REGISTERED

## 2020-08-25 NOTE — PROGRESS NOTES
Nutrition Assessment     Amy Ozzy a 60year old male.     Referred by: Attending  Referring Physician Name: Ruben Pat MD     Assessment      Medical Nutrition Therapy Comment: Obesity  Visit Information: Follow-up Visit 8/25/20     ANTHROPOM % = 34%. Goal is less then 30%  5. 57 pounds until goal weight.   6 Continue cardiovascular exercise 4 days per week at the gym.     Recommendation to MD: none at this time  Assessment of Ability/Barriers      Patient and/or Family Ursula Orona

## 2020-12-18 ENCOUNTER — IMMUNIZATION (OUTPATIENT)
Dept: LAB | Facility: HOSPITAL | Age: 65
End: 2020-12-18
Attending: PREVENTIVE MEDICINE
Payer: COMMERCIAL

## 2020-12-18 DIAGNOSIS — Z23 NEED FOR VACCINATION: ICD-10-CM

## 2020-12-18 PROCEDURE — 0001A PFIZER-BIONTECH COVID-19 VACCINE: CPT

## 2021-01-05 ENCOUNTER — HOSPITAL ENCOUNTER (OUTPATIENT)
Dept: NUTRITION | Facility: HOSPITAL | Age: 66
Discharge: HOME | End: 2021-01-05
Attending: INTERNAL MEDICINE
Payer: COMMERCIAL

## 2021-01-05 DIAGNOSIS — E66.9 OBESITY, UNSPECIFIED: ICD-10-CM

## 2021-01-05 PROCEDURE — 97803 MED NUTRITION INDIV SUBSEQ: CPT | Performed by: DIETITIAN, REGISTERED

## 2021-01-05 NOTE — PROGRESS NOTES
Nutrition Assessment     Vu Mensah a 60year old male.     Referred by: Attending  Referring Physician Name: Ruben Smart MD     Assessment      Medical Nutrition Therapy Comment: Obesity  Visit Information: Follow-up Visit 1/5/21  Follow up don Try [employer] for frozen foods to use for dinners. 4. Current Body fat % = 36.2%. Goal is less then 30%  5. 65 pounds until goal weight.   6 Continue cardiovascular exercise 4 days per week at the gym.     Recommendation to MD: none at this time  Assessmen

## 2021-01-08 ENCOUNTER — IMMUNIZATION (OUTPATIENT)
Dept: LAB | Facility: HOSPITAL | Age: 66
End: 2021-01-08
Attending: PREVENTIVE MEDICINE
Payer: COMMERCIAL

## 2021-01-08 DIAGNOSIS — Z23 NEED FOR VACCINATION: ICD-10-CM

## 2021-01-08 PROCEDURE — 0002A SARSCOV2 VAC 30MCG/0.3ML IM: CPT

## 2021-02-09 ENCOUNTER — HOSPITAL ENCOUNTER (OUTPATIENT)
Dept: NUTRITION | Facility: HOSPITAL | Age: 66
Discharge: HOME | End: 2021-02-09
Attending: INTERNAL MEDICINE
Payer: COMMERCIAL

## 2021-02-09 DIAGNOSIS — E66.9 OBESITY, UNSPECIFIED: ICD-10-CM

## 2021-02-09 PROCEDURE — 97803 MED NUTRITION INDIV SUBSEQ: CPT | Performed by: DIETITIAN, REGISTERED

## 2021-02-09 NOTE — PROGRESS NOTES
Nutrition Assessment     Law Cadet a 60year old male.     Referred by: Attending  Referring Physician Name: Ruben Tapia MD     Assessment      Medical Nutrition Therapy Comment: Obesity  Visit Information: Follow-up Visit 2/9/21  Follow up don % = 35.7%. Goal is less then 30%  5. 62 pounds until goal weight.   6 Continue cardiovascular exercise 4 days per week at the gym.     Recommendation to MD: none at this time  Assessment of Ability/Barriers      Patient and/or Family Yann Medley

## 2021-03-09 ENCOUNTER — HOSPITAL ENCOUNTER (OUTPATIENT)
Dept: NUTRITION | Facility: HOSPITAL | Age: 66
Discharge: HOME | End: 2021-03-09
Attending: INTERNAL MEDICINE
Payer: COMMERCIAL

## 2021-03-09 DIAGNOSIS — E66.9 OBESITY (BMI 30-39.9): ICD-10-CM

## 2021-03-09 PROCEDURE — 97803 MED NUTRITION INDIV SUBSEQ: CPT | Performed by: DIETITIAN, REGISTERED

## 2021-03-10 NOTE — PROGRESS NOTES
Nutrition Assessment     Sam Streeter a 60year old male.     Referred by: Attending  Referring Physician Name: Ruben Fuentes MD     Assessment      Medical Nutrition Therapy Comment: Obesity  Visit Information: Follow-up Visit 3/9/21  Follow up don weight.   6 Continue cardiovascular exercise 4 days per week at the gym.     Recommendation to MD: none at this time  Assessment of Ability/Barriers      Patient and/or Family Will: Verbalize Understanding     Patient and/or Family Ability to [address] [facility]

## 2021-04-20 ENCOUNTER — HOSPITAL ENCOUNTER (OUTPATIENT)
Dept: NUTRITION | Facility: HOSPITAL | Age: 66
Discharge: HOME | End: 2021-04-20
Attending: INTERNAL MEDICINE
Payer: COMMERCIAL

## 2021-04-20 DIAGNOSIS — E66.09 CLASS 2 OBESITY DUE TO EXCESS CALORIES WITHOUT SERIOUS COMORBIDITY WITH BODY MASS INDEX (BMI) OF 35.0 TO 35.9 IN ADULT: ICD-10-CM

## 2021-04-20 PROCEDURE — 97803 MED NUTRITION INDIV SUBSEQ: CPT | Performed by: DIETITIAN, REGISTERED

## 2021-04-20 NOTE — PROGRESS NOTES
Nutrition Assessment     [place] Bras a 60year old male.     Referred by: Attending  Referring Physician Name: Ruben Wallace MD     Assessment      Medical Nutrition Therapy Comment: Obesity  Visit Information: Follow-up Visit 4/20/21  Follow up do a week to help increase activity.    4. Current Body fat % = 36.8%. Goal is less then 30%  5. 62 pounds until goal weight.     Recommendation to MD: none at this time  Assessment of Ability/Barriers      Patient and/or Family Will: Verbalize Understanding

## 2021-06-26 ENCOUNTER — LAB ENCOUNTER (OUTPATIENT)
Dept: LAB | Age: 66
End: 2021-06-26
Attending: INTERNAL MEDICINE
Payer: COMMERCIAL

## 2021-06-26 DIAGNOSIS — Z13.220 SCREENING FOR LIPOID DISORDERS: Primary | ICD-10-CM

## 2021-06-26 DIAGNOSIS — Z00.00 ROUTINE MEDICAL EXAM: ICD-10-CM

## 2021-06-26 PROCEDURE — 85025 COMPLETE CBC W/AUTO DIFF WBC: CPT

## 2021-06-26 PROCEDURE — 80053 COMPREHEN METABOLIC PANEL: CPT

## 2021-06-26 PROCEDURE — 36415 COLL VENOUS BLD VENIPUNCTURE: CPT

## 2021-06-26 PROCEDURE — 80061 LIPID PANEL: CPT

## 2021-09-28 ENCOUNTER — HOSPITAL ENCOUNTER (OUTPATIENT)
Dept: NUTRITION | Facility: HOSPITAL | Age: 66
Discharge: HOME | End: 2021-09-28
Attending: INTERNAL MEDICINE
Payer: COMMERCIAL

## 2021-09-28 DIAGNOSIS — E66.09 CLASS 2 OBESITY DUE TO EXCESS CALORIES WITHOUT SERIOUS COMORBIDITY WITH BODY MASS INDEX (BMI) OF 39.0 TO 39.9 IN ADULT: ICD-10-CM

## 2021-09-28 PROCEDURE — 97803 MED NUTRITION INDIV SUBSEQ: CPT | Performed by: DIETITIAN, REGISTERED

## 2021-09-28 NOTE — PROGRESS NOTES
Nutrition Assessment    Johanny Munguia is a 73year old male. Referred by:  Attending  Referring Physician Name: Eladia Flores Nutrition Therapy Comment: Obesity (E66.9)  Visit Information: Follow-up Visit 9/28/21   30 minutes Journal      NUTRITION PRESCRIPTION:      Needs:  [id number] Calorie     90 gm Protein      Oral Diet Prescription: Balanced CHO 1600 Calorie      Goals     Nutrition Goals:   1. Make a high protein smoothie at home for breakfast 3-5 days per week.    2. Tra

## 2021-10-08 ENCOUNTER — IMMUNIZATION (OUTPATIENT)
Dept: LAB | Facility: HOSPITAL | Age: 66
End: 2021-10-08
Attending: EMERGENCY MEDICINE
Payer: COMMERCIAL

## 2021-10-08 DIAGNOSIS — Z23 NEED FOR VACCINATION: Primary | ICD-10-CM

## 2021-10-08 PROCEDURE — 0004A SARSCOV2 VAC 30MCG/0.3ML IM: CPT

## 2021-10-08 PROCEDURE — 0003A SARSCOV2 VAC 30MCG/0.3ML IM: CPT

## 2022-01-08 ENCOUNTER — LAB ENCOUNTER (OUTPATIENT)
Dept: LAB | Facility: HOSPITAL | Age: 67
End: 2022-01-08
Attending: INTERNAL MEDICINE
Payer: COMMERCIAL

## 2022-01-08 DIAGNOSIS — Z13.220 SCREENING FOR LIPOID DISORDERS: Primary | ICD-10-CM

## 2022-01-08 LAB
ALBUMIN SERPL-MCNC: 3.9 G/DL (ref 3.4–5)
ALBUMIN/GLOB SERPL: 1.1 {RATIO} (ref 1–2)
ALP LIVER SERPL-CCNC: 96 U/L
ALT SERPL-CCNC: 32 U/L
ANION GAP SERPL CALC-SCNC: 6 MMOL/L (ref 0–18)
AST SERPL-CCNC: 15 U/L (ref 15–37)
BILIRUB SERPL-MCNC: 1.3 MG/DL (ref 0.1–2)
BUN BLD-MCNC: 20 MG/DL (ref 7–18)
BUN/CREAT SERPL: 20.6 (ref 10–20)
CALCIUM BLD-MCNC: 9 MG/DL (ref 8.5–10.1)
CHLORIDE SERPL-SCNC: 108 MMOL/L (ref 98–112)
CHOLEST SERPL-MCNC: 191 MG/DL (ref ?–200)
CO2 SERPL-SCNC: 26 MMOL/L (ref 21–32)
CREAT BLD-MCNC: 0.97 MG/DL
FASTING PATIENT LIPID ANSWER: YES
FASTING STATUS PATIENT QL REPORTED: YES
GLOBULIN PLAS-MCNC: 3.6 G/DL (ref 2.8–4.4)
GLUCOSE BLD-MCNC: 105 MG/DL (ref 70–99)
HDLC SERPL-MCNC: 64 MG/DL (ref 40–59)
LDLC SERPL CALC-MCNC: 116 MG/DL (ref ?–100)
NONHDLC SERPL-MCNC: 127 MG/DL (ref ?–130)
OSMOLALITY SERPL CALC.SUM OF ELEC: 293 MOSM/KG (ref 275–295)
POTASSIUM SERPL-SCNC: 4.5 MMOL/L (ref 3.5–5.1)
PROT SERPL-MCNC: 7.5 G/DL (ref 6.4–8.2)
SODIUM SERPL-SCNC: 140 MMOL/L (ref 136–145)
TRIGL SERPL-MCNC: 57 MG/DL (ref 30–149)
VLDLC SERPL CALC-MCNC: 10 MG/DL (ref 0–30)

## 2022-01-08 PROCEDURE — 80061 LIPID PANEL: CPT

## 2022-01-08 PROCEDURE — 80053 COMPREHEN METABOLIC PANEL: CPT

## 2022-01-08 PROCEDURE — 36415 COLL VENOUS BLD VENIPUNCTURE: CPT

## 2022-06-17 ENCOUNTER — HOSPITAL ENCOUNTER (OUTPATIENT)
Dept: GENERAL RADIOLOGY | Age: 67
Discharge: HOME | End: 2022-06-17
Attending: INTERNAL MEDICINE
Payer: COMMERCIAL

## 2022-06-17 DIAGNOSIS — M54.41 ACUTE BILATERAL LOW BACK PAIN WITH RIGHT-SIDED SCIATICA: ICD-10-CM

## 2022-06-17 DIAGNOSIS — M25.551 RIGHT HIP PAIN: ICD-10-CM

## 2022-06-17 PROCEDURE — 73502 X-RAY EXAM HIP UNI 2-3 VIEWS: CPT | Performed by: INTERNAL MEDICINE

## 2022-06-17 PROCEDURE — 72100 X-RAY EXAM L-S SPINE 2/3 VWS: CPT | Performed by: INTERNAL MEDICINE

## 2022-07-05 ENCOUNTER — ORDER TRANSCRIPTION (OUTPATIENT)
Dept: PHYSICAL THERAPY | Facility: HOSPITAL | Age: 67
End: 2022-07-05

## 2022-07-05 DIAGNOSIS — M54.31 RIGHT SCIATIC NERVE PAIN: Primary | ICD-10-CM

## 2022-08-04 ENCOUNTER — TELEPHONE (OUTPATIENT)
Dept: PHYSICAL THERAPY | Facility: HOSPITAL | Age: 67
End: 2022-08-04

## 2022-08-08 ENCOUNTER — TELEPHONE (OUTPATIENT)
Dept: PHYSICAL THERAPY | Facility: HOSPITAL | Age: 67
End: 2022-08-08

## 2022-08-09 ENCOUNTER — OFFICE VISIT (OUTPATIENT)
Dept: PHYSICAL THERAPY | Facility: HOSPITAL | Age: 67
End: 2022-08-09
Attending: INTERNAL MEDICINE
Payer: COMMERCIAL

## 2022-08-09 DIAGNOSIS — M54.31 RIGHT SCIATIC NERVE PAIN: ICD-10-CM

## 2022-08-09 PROCEDURE — 97161 PT EVAL LOW COMPLEX 20 MIN: CPT

## 2022-08-09 PROCEDURE — 97110 THERAPEUTIC EXERCISES: CPT

## 2022-08-11 ENCOUNTER — OFFICE VISIT (OUTPATIENT)
Dept: PHYSICAL THERAPY | Facility: HOSPITAL | Age: 67
End: 2022-08-11
Attending: INTERNAL MEDICINE
Payer: COMMERCIAL

## 2022-08-11 DIAGNOSIS — M54.31 RIGHT SCIATIC NERVE PAIN: ICD-10-CM

## 2022-08-11 PROCEDURE — 97110 THERAPEUTIC EXERCISES: CPT

## 2022-08-16 ENCOUNTER — OFFICE VISIT (OUTPATIENT)
Dept: PHYSICAL THERAPY | Facility: HOSPITAL | Age: 67
End: 2022-08-16
Attending: INTERNAL MEDICINE
Payer: COMMERCIAL

## 2022-08-16 DIAGNOSIS — M54.31 RIGHT SCIATIC NERVE PAIN: ICD-10-CM

## 2022-08-16 PROCEDURE — 97112 NEUROMUSCULAR REEDUCATION: CPT

## 2022-08-16 PROCEDURE — 97110 THERAPEUTIC EXERCISES: CPT

## 2022-08-18 ENCOUNTER — APPOINTMENT (OUTPATIENT)
Dept: PHYSICAL THERAPY | Facility: HOSPITAL | Age: 67
End: 2022-08-18
Attending: INTERNAL MEDICINE
Payer: COMMERCIAL

## 2022-08-23 ENCOUNTER — OFFICE VISIT (OUTPATIENT)
Dept: PHYSICAL THERAPY | Facility: HOSPITAL | Age: 67
End: 2022-08-23
Attending: INTERNAL MEDICINE
Payer: COMMERCIAL

## 2022-08-23 DIAGNOSIS — M54.31 RIGHT SCIATIC NERVE PAIN: ICD-10-CM

## 2022-08-23 PROCEDURE — 97112 NEUROMUSCULAR REEDUCATION: CPT

## 2022-08-23 PROCEDURE — 97110 THERAPEUTIC EXERCISES: CPT

## 2022-08-25 ENCOUNTER — OFFICE VISIT (OUTPATIENT)
Dept: PHYSICAL THERAPY | Facility: HOSPITAL | Age: 67
End: 2022-08-25
Attending: INTERNAL MEDICINE
Payer: COMMERCIAL

## 2022-08-25 DIAGNOSIS — M54.31 RIGHT SCIATIC NERVE PAIN: ICD-10-CM

## 2022-08-25 PROCEDURE — 97110 THERAPEUTIC EXERCISES: CPT

## 2022-08-25 PROCEDURE — 97112 NEUROMUSCULAR REEDUCATION: CPT

## 2022-08-30 ENCOUNTER — OFFICE VISIT (OUTPATIENT)
Dept: PHYSICAL THERAPY | Facility: HOSPITAL | Age: 67
End: 2022-08-30
Attending: INTERNAL MEDICINE
Payer: COMMERCIAL

## 2022-08-30 DIAGNOSIS — M54.31 RIGHT SCIATIC NERVE PAIN: ICD-10-CM

## 2022-08-30 PROCEDURE — 97110 THERAPEUTIC EXERCISES: CPT

## 2022-09-01 ENCOUNTER — APPOINTMENT (OUTPATIENT)
Dept: PHYSICAL THERAPY | Facility: HOSPITAL | Age: 67
End: 2022-09-01
Attending: INTERNAL MEDICINE
Payer: COMMERCIAL

## 2022-10-28 ENCOUNTER — IMMUNIZATION (OUTPATIENT)
Dept: LAB | Facility: HOSPITAL | Age: 67
End: 2022-10-28
Attending: PREVENTIVE MEDICINE
Payer: COMMERCIAL

## 2022-10-28 DIAGNOSIS — Z23 NEED FOR VACCINATION: Primary | ICD-10-CM

## 2022-10-28 PROCEDURE — 0124A SARSCOV2 VAC BVL 30MCG/0.3ML: CPT

## 2022-10-28 PROCEDURE — 90471 IMMUNIZATION ADMIN: CPT

## 2022-11-09 ENCOUNTER — OFFICE VISIT (OUTPATIENT)
Facility: CLINIC | Age: 67
End: 2022-11-09
Payer: COMMERCIAL

## 2022-11-09 ENCOUNTER — TELEPHONE (OUTPATIENT)
Dept: GASTROENTEROLOGY | Facility: CLINIC | Age: 67
End: 2022-11-09

## 2022-11-09 VITALS
SYSTOLIC BLOOD PRESSURE: 150 MMHG | HEART RATE: 94 BPM | WEIGHT: 272 LBS | DIASTOLIC BLOOD PRESSURE: 90 MMHG | HEIGHT: 66 IN | BODY MASS INDEX: 43.71 KG/M2

## 2022-11-09 DIAGNOSIS — Z80.0 FAMILY HISTORY OF COLON CANCER: Primary | ICD-10-CM

## 2022-11-09 DIAGNOSIS — Z12.11 SCREENING FOR COLORECTAL CANCER: ICD-10-CM

## 2022-11-09 DIAGNOSIS — Z86.010 HISTORY OF COLON POLYPS: Primary | ICD-10-CM

## 2022-11-09 DIAGNOSIS — Z86.010 HISTORY OF COLON POLYPS: ICD-10-CM

## 2022-11-09 DIAGNOSIS — Z12.12 SCREENING FOR COLORECTAL CANCER: ICD-10-CM

## 2022-11-09 PROCEDURE — 99203 OFFICE O/P NEW LOW 30 MIN: CPT | Performed by: INTERNAL MEDICINE

## 2022-11-09 PROCEDURE — 3080F DIAST BP >= 90 MM HG: CPT | Performed by: INTERNAL MEDICINE

## 2022-11-09 PROCEDURE — 3008F BODY MASS INDEX DOCD: CPT | Performed by: INTERNAL MEDICINE

## 2022-11-09 PROCEDURE — 3077F SYST BP >= 140 MM HG: CPT | Performed by: INTERNAL MEDICINE

## 2022-11-09 RX ORDER — CALCIUM POLYCARBOPHIL 625 MG
TABLET ORAL
COMMUNITY

## 2022-11-09 RX ORDER — SODIUM, POTASSIUM,MAG SULFATES 17.5-3.13G
SOLUTION, RECONSTITUTED, ORAL ORAL
Qty: 1 EACH | Refills: 0 | Status: SHIPPED | OUTPATIENT
Start: 2022-11-09

## 2022-11-09 RX ORDER — MULTIVIT-MIN/IRON/FOLIC ACID/K 18-600-40
CAPSULE ORAL
COMMUNITY

## 2022-11-09 NOTE — PATIENT INSTRUCTIONS
Schedule a screening colonoscopy and for a history of polyps following a split dose Suprep and monitored anesthesia care.

## 2022-11-09 NOTE — TELEPHONE ENCOUNTER
Scheduled for:  Colonoscopy [address]  Provider Name:  Dr Tha Saxena  Date:  03/08/2023  Location:  [facility]  Sedation:  MAC  Time:  4966 (pt is aware to arrive at 0930)  Prep: Colyte    Meds/Allergies Reconciled?:  Physician reviewed  Diagnosis with codes:   Hx of colon polyps Z86.010  Was patient informed to call insurance with codes (Y/N):   Y     Referral sent?:  NA  [facility] or [address] notified?:  I sent an electronic request to Endo Scheduling and received a confirmation today. Medication Orders:  Pt is aware to NOT take iron pills, herbal meds and diet supplements for 7 days before exam. Also to NOT take any form of alcohol, recreational drugs and any forms of ED meds 24 hours before exam.     Misc Orders:       Further instructions given by staff:  I discussed the prep intructions with the patient in office which HE verbally understood. Copy of instructions was handed to patient as well. Patient was also advised he will receive a call from PAT nurse 72-24 hours prior procedure to schedule Covid test done.

## 2023-01-09 ENCOUNTER — TELEPHONE (OUTPATIENT)
Dept: INTERNAL MEDICINE CLINIC | Facility: HOSPITAL | Age: 68
End: 2023-01-09

## 2023-01-30 ENCOUNTER — OFFICE VISIT (OUTPATIENT)
Dept: DERMATOLOGY CLINIC | Facility: CLINIC | Age: 68
End: 2023-01-30

## 2023-01-30 DIAGNOSIS — L91.8 INFLAMED ACROCHORDON: Primary | ICD-10-CM

## 2023-01-30 PROCEDURE — 11200 RMVL SKIN TAGS UP TO&INC 15: CPT | Performed by: STUDENT IN AN ORGANIZED HEALTH CARE EDUCATION/TRAINING PROGRAM

## 2023-02-03 ENCOUNTER — HOSPITAL ENCOUNTER (OUTPATIENT)
Dept: GENERAL RADIOLOGY | Age: 68
Discharge: HOME | End: 2023-02-03
Attending: ORTHOPAEDIC SURGERY
Payer: COMMERCIAL

## 2023-02-03 ENCOUNTER — OFFICE VISIT (OUTPATIENT)
Dept: ORTHOPEDICS CLINIC | Facility: CLINIC | Age: 68
End: 2023-02-03
Payer: COMMERCIAL

## 2023-02-03 VITALS — BODY MASS INDEX: 43.71 KG/M2 | WEIGHT: 272 LBS | HEIGHT: 66 IN

## 2023-02-03 DIAGNOSIS — Z96.653 STATUS POST BILATERAL KNEE REPLACEMENTS: ICD-10-CM

## 2023-02-03 DIAGNOSIS — Z96.653 STATUS POST BILATERAL KNEE REPLACEMENTS: Primary | ICD-10-CM

## 2023-02-03 DIAGNOSIS — M25.562 PAIN IN BOTH KNEES, UNSPECIFIED CHRONICITY: ICD-10-CM

## 2023-02-03 DIAGNOSIS — M25.561 PAIN IN BOTH KNEES, UNSPECIFIED CHRONICITY: ICD-10-CM

## 2023-02-03 PROCEDURE — 73562 X-RAY EXAM OF KNEE 3: CPT | Performed by: ORTHOPAEDIC SURGERY

## 2023-02-03 PROCEDURE — 3008F BODY MASS INDEX DOCD: CPT | Performed by: ORTHOPAEDIC SURGERY

## 2023-02-03 PROCEDURE — 99203 OFFICE O/P NEW LOW 30 MIN: CPT | Performed by: ORTHOPAEDIC SURGERY

## 2023-02-03 NOTE — PROGRESS NOTES
Patient is a 79-year-old white male here for evaluation of his knees. Patient has had bilateral total knee arthroplasties performed in the past.  Patient states that the physician who put the prosthetic in retired and he was advised though to still follow-up every year. Patient has no complaints other than occasional stiffness. Patient's right knee was done approximately 15 to 16 years ago and the left knee was done about 5 years ago. Patient's exam shows him to have a nonantalgic gait. Exam of his knee shows him to have full extension perhaps a couple degrees of hyperextension and flexion about 120 degrees. No instability. Well-healed surgical scars. Straight leg raising intact. X-rays of the knees show prosthetics to be in place bilaterally. No evidence of loosening. No significant narrowing of the joint space noted. Impression is that a stable total knee arthroplasties bilaterally. Recommendation is for the patient to come in as needed. He does not need to come in every year but he certainly encouraged to come in if he has any concerns or questions about the prostheses. Continue with activities as tolerated.

## 2023-02-21 ENCOUNTER — TELEPHONE (OUTPATIENT)
Facility: CLINIC | Age: 68
End: 2023-02-21

## 2023-02-21 NOTE — TELEPHONE ENCOUNTER
Called and spoke to the patient,  verified. The patient wanted to know if he can use generic for his bowel prep. I told the patient it does not matter, whatever his preference. The patient was thankful after our conversation.

## 2023-03-06 ENCOUNTER — TELEPHONE (OUTPATIENT)
Facility: CLINIC | Age: 68
End: 2023-03-06

## 2023-03-06 NOTE — TELEPHONE ENCOUNTER
Spoke to the patient,  verified. I explained to the patient to follow the package on how to reconstitute suprep and then follow [facility] bowel prep. The patient verbalized understanding of the info given.

## 2023-03-08 ENCOUNTER — ANESTHESIA EVENT (OUTPATIENT)
Dept: ENDOSCOPY | Facility: HOSPITAL | Age: 68
End: 2023-03-08
Payer: COMMERCIAL

## 2023-03-08 ENCOUNTER — HOSPITAL ENCOUNTER (OUTPATIENT)
Facility: HOSPITAL | Age: 68
Setting detail: HOSPITAL OUTPATIENT SURGERY
Discharge: HOME | End: 2023-03-08
Attending: INTERNAL MEDICINE | Admitting: INTERNAL MEDICINE
Payer: COMMERCIAL

## 2023-03-08 ENCOUNTER — ANESTHESIA (OUTPATIENT)
Dept: ENDOSCOPY | Facility: HOSPITAL | Age: 68
End: 2023-03-08
Payer: COMMERCIAL

## 2023-03-08 VITALS
WEIGHT: 260 LBS | HEART RATE: 89 BPM | SYSTOLIC BLOOD PRESSURE: 152 MMHG | BODY MASS INDEX: 41.78 KG/M2 | OXYGEN SATURATION: 87 % | HEIGHT: 66 IN | DIASTOLIC BLOOD PRESSURE: 98 MMHG | RESPIRATION RATE: 18 BRPM

## 2023-03-08 DIAGNOSIS — Z86.010 HISTORY OF COLON POLYPS: ICD-10-CM

## 2023-03-08 PROCEDURE — 0DBL8ZX EXCISION OF TRANSVERSE COLON, VIA NATURAL OR ARTIFICIAL OPENING ENDOSCOPIC, DIAGNOSTIC: ICD-10-PCS | Performed by: INTERNAL MEDICINE

## 2023-03-08 PROCEDURE — 0DBN8ZX EXCISION OF SIGMOID COLON, VIA NATURAL OR ARTIFICIAL OPENING ENDOSCOPIC, DIAGNOSTIC: ICD-10-PCS | Performed by: INTERNAL MEDICINE

## 2023-03-08 PROCEDURE — 0DBH8ZX EXCISION OF CECUM, VIA NATURAL OR ARTIFICIAL OPENING ENDOSCOPIC, DIAGNOSTIC: ICD-10-PCS | Performed by: INTERNAL MEDICINE

## 2023-03-08 PROCEDURE — 45385 COLONOSCOPY W/LESION REMOVAL: CPT | Performed by: INTERNAL MEDICINE

## 2023-03-08 PROCEDURE — 45380 COLONOSCOPY AND BIOPSY: CPT | Performed by: INTERNAL MEDICINE

## 2023-03-08 RX ORDER — NALOXONE HYDROCHLORIDE 0.4 MG/ML
80 INJECTION, SOLUTION INTRAMUSCULAR; INTRAVENOUS; SUBCUTANEOUS AS NEEDED
Status: DISCONTINUED | OUTPATIENT
Start: 2023-03-08 | End: 2023-03-08

## 2023-03-08 RX ORDER — LIDOCAINE HYDROCHLORIDE 10 MG/ML
INJECTION, SOLUTION EPIDURAL; INFILTRATION; INTRACAUDAL; PERINEURAL AS NEEDED
Status: DISCONTINUED | OUTPATIENT
Start: 2023-03-08 | End: 2023-03-08 | Stop reason: SURG

## 2023-03-08 RX ORDER — SODIUM CHLORIDE, SODIUM LACTATE, POTASSIUM CHLORIDE, CALCIUM CHLORIDE 600; 310; 30; 20 MG/100ML; MG/100ML; MG/100ML; MG/100ML
INJECTION, SOLUTION INTRAVENOUS CONTINUOUS
Status: DISCONTINUED | OUTPATIENT
Start: 2023-03-08 | End: 2023-03-08

## 2023-03-08 RX ADMIN — LIDOCAINE HYDROCHLORIDE 25 MG: 10 INJECTION, SOLUTION EPIDURAL; INFILTRATION; INTRACAUDAL; PERINEURAL at 11:09:00

## 2023-03-08 NOTE — OPERATIVE REPORT
[address] Endoscopy Report      Date of Procedure:  03/08/23      Preoperative Diagnosis:  1. Colorectal cancer screening  2. Family history of colon cancer  3. Personal history of hyperplastic polyps      Postoperative Diagnosis:  1. Colon polyps  2. Colonic diverticulosis      Procedure:    Colonoscopy with polypectomy      Surgeon:  Yi Carlin M.D. Anesthesia:  Monitored anesthesia care  Cecal withdrawal time: 20 minutes  EBL:  Insignificant      Brief History: This is a 78year old male who presents for a screening colonoscopy in the setting of a family history of colon cancer in his father at the age of 80. The patient's last colonoscopy is approaching 11 years prior with removal of multiple hyperplastic polyps distal to the splenic flexure. Other than an occasional sense of incomplete evacuation the patient has been asymptomatic from a lower gastrointestinal tract standpoint. Technique:  After informed consent, the patient was placed in the left lateral recumbent position. Digital rectal examination revealed no palpable intraluminal abnormalities. A hypertrophied anal papilla was present. An Olympus variable stiffness 190 series HD pediatric colonoscope was inserted into the rectum and advanced under direct vision by following the lumen to the terminal ileum. The colon was examined upon withdrawal in the left lateral recumbent position. Findings:  The preparation of the colon was good. The terminal ileum was examined for 5 cm and visually normal.  The ileocecal valve was well preserved. The visualized colonic mucosa from the cecum to the anal verge was normal with an intact vascular pattern. There were #6 polyps seen within the colon which were removed as follows:    1. In the cecum there was a 3 mm sessile polyp which was removed with a cold biopsy forceps.   2.  In the distal transverse colon there were #3 4-5 mm sessile polyps which were cold snare excised and retrieved. 3.  In the sigmoid colon there were #2 4-6 mm sessile polyps which were cold snare excised and retrieved. Inspection of all sites revealed no evidence of ongoing bleeding. There were a few diverticula seen in the region of the hepatic flexure and several diverticula in the sigmoid without current signs of complication. There were no other colonic polyps, mass lesions, vascular anomalies or signs of inflammation seen. Retroflexion in the rectum revealed a hypertrophied anal papilla. The procedure was well tolerated without immediate complication. Impression:  1. Colon polyps  2. Uncomplicated colonic diverticulosis    Recommendations:  1. High-fiber diet. 2.  Follow-up biopsy results. 3.  Surveillance/screening colonoscopy in 3-5 years depending on the presence/number of adenomatous polyps.         Peña Lara MD  3/8/2023

## 2023-03-08 NOTE — DISCHARGE INSTRUCTIONS

## 2023-03-08 NOTE — ANESTHESIA POSTPROCEDURE EVALUATION
Patient: Isabella Martinez    Procedure Summary     Date: 03/08/23 Room / Location: [address] [place] [address] ENDOSCOPY 04 / [address] [facility] ENDOSCOPY    Anesthesia Start: 1108 Anesthesia Stop: 1003    Procedure: COLONOSCOPY Diagnosis:       History of colon polyps      (polyps, diverticulosis)    Surgeons: Dax Acuña MD Anesthesiologist: Claritza Mohan CRNA    Anesthesia Type: MAC ASA Status: 3          Anesthesia Type: MAC    Vitals Value Taken Time   /82 03/08/23 1138   Temp  03/08/23 1140   Pulse 97 03/08/23 1138   Resp 17 03/08/23 1138   SpO2 95 % 03/08/23 1138       [facility] AN Post Evaluation:   Patient Evaluated in PACU  Patient Participation: complete - patient participated  Level of Consciousness: awake and alert  Pain Score: 0  Pain Management: adequate  Airway Patency:patent  Yes    Cardiovascular Status: blood pressure returned to baseline  Respiratory Status: acceptable  Postoperative Hydration acceptable  Comments: Report given to OPAL Daniel in recovery.       Char Kaye CRNA  3/8/2023 11:40 AM

## 2023-03-10 ENCOUNTER — TELEPHONE (OUTPATIENT)
Facility: CLINIC | Age: 68
End: 2023-03-10

## 2023-03-10 ENCOUNTER — MED REC SCAN ONLY (OUTPATIENT)
Facility: CLINIC | Age: 68
End: 2023-03-10

## 2023-03-10 NOTE — TELEPHONE ENCOUNTER
5  year colonoscopy recall entered. Health maintenance updated. Colonoscopy done on 3/8/23 and next due on 3/8/28.

## 2023-03-10 NOTE — TELEPHONE ENCOUNTER
----- Message from Esteban Cardoso MD sent at 3/9/2023  5:36 PM CST -----  I spoke to Loren. He is feeling well. His polyps were not adenomatous. We discussed diverticulosis. He has also been experiencing incomplete evacuation which may be due to pelvic floor dysfunction. I have recommended the followin. Screening colonoscopy in 5 years in light of family history  2. Increase Fiber Choice to #2 tablets twice daily and assess symptoms. GI RNs: Please enter colonoscopy recall for 5 years.

## 2024-04-09 ENCOUNTER — TELEPHONE (OUTPATIENT)
Dept: INTERNAL MEDICINE CLINIC | Facility: CLINIC | Age: 69
End: 2024-04-09

## 2024-04-09 ENCOUNTER — OFFICE VISIT (OUTPATIENT)
Dept: INTERNAL MEDICINE CLINIC | Facility: CLINIC | Age: 69
End: 2024-04-09

## 2024-04-09 VITALS
WEIGHT: 281 LBS | HEIGHT: 66 IN | DIASTOLIC BLOOD PRESSURE: 98 MMHG | HEART RATE: 88 BPM | BODY MASS INDEX: 45.16 KG/M2 | SYSTOLIC BLOOD PRESSURE: 152 MMHG | OXYGEN SATURATION: 93 % | TEMPERATURE: 98 F

## 2024-04-09 DIAGNOSIS — F41.0 PANIC ATTACK: ICD-10-CM

## 2024-04-09 DIAGNOSIS — Z12.5 SCREENING PSA (PROSTATE SPECIFIC ANTIGEN): ICD-10-CM

## 2024-04-09 DIAGNOSIS — Z91.09 ENVIRONMENTAL ALLERGIES: ICD-10-CM

## 2024-04-09 DIAGNOSIS — R35.1 NOCTURIA: ICD-10-CM

## 2024-04-09 DIAGNOSIS — M19.072 PRIMARY OSTEOARTHRITIS OF BOTH FEET: ICD-10-CM

## 2024-04-09 DIAGNOSIS — E55.9 VITAMIN D DEFICIENCY: ICD-10-CM

## 2024-04-09 DIAGNOSIS — M54.16 LUMBAR RADICULOPATHY: ICD-10-CM

## 2024-04-09 DIAGNOSIS — F41.9 ANXIETY: ICD-10-CM

## 2024-04-09 DIAGNOSIS — M19.071 PRIMARY OSTEOARTHRITIS OF BOTH FEET: ICD-10-CM

## 2024-04-09 DIAGNOSIS — K21.9 GASTROESOPHAGEAL REFLUX DISEASE WITHOUT ESOPHAGITIS: ICD-10-CM

## 2024-04-09 DIAGNOSIS — Z00.00 ENCOUNTER FOR ANNUAL HEALTH EXAMINATION: Primary | ICD-10-CM

## 2024-04-09 DIAGNOSIS — E66.01 CLASS 3 SEVERE OBESITY WITH SERIOUS COMORBIDITY AND BODY MASS INDEX (BMI) OF 45.0 TO 49.9 IN ADULT, UNSPECIFIED OBESITY TYPE (HCC): ICD-10-CM

## 2024-04-09 DIAGNOSIS — I10 PRIMARY HYPERTENSION: ICD-10-CM

## 2024-04-09 DIAGNOSIS — G47.33 OSA ON CPAP: ICD-10-CM

## 2024-04-09 DIAGNOSIS — M17.12 PRIMARY OSTEOARTHRITIS OF LEFT KNEE: ICD-10-CM

## 2024-04-09 PROBLEM — E66.813 CLASS 3 SEVERE OBESITY WITH SERIOUS COMORBIDITY AND BODY MASS INDEX (BMI) OF 45.0 TO 49.9 IN ADULT (HCC): Status: ACTIVE | Noted: 2024-04-09

## 2024-04-09 PROBLEM — E66.813 CLASS 3 SEVERE OBESITY WITH SERIOUS COMORBIDITY AND BODY MASS INDEX (BMI) OF 45.0 TO 49.9 IN ADULT: Status: ACTIVE | Noted: 2024-04-09

## 2024-04-09 LAB
ATRIAL RATE: 60 BPM
P AXIS: 24 DEGREES
P-R INTERVAL: 192 MS
Q-T INTERVAL: 382 MS
QRS DURATION: 94 MS
QTC CALCULATION (BEZET): 382 MS
R AXIS: 25 DEGREES
T AXIS: 23 DEGREES
VENTRICULAR RATE: 60 BPM

## 2024-04-09 RX ORDER — LISINOPRIL AND HYDROCHLOROTHIAZIDE 25; 20 MG/1; MG/1
1 TABLET ORAL DAILY
Qty: 30 TABLET | Refills: 2 | Status: SHIPPED | OUTPATIENT
Start: 2024-04-09

## 2024-04-09 RX ORDER — FLUTICASONE PROPIONATE 50 MCG
2 SPRAY, SUSPENSION (ML) NASAL NIGHTLY
COMMUNITY

## 2024-04-09 RX ORDER — LORATADINE 10 MG/1
10 TABLET ORAL DAILY
COMMUNITY

## 2024-04-09 NOTE — PROGRESS NOTES
Subjective:   Noel Junior is a 68 year old male who presents for a Medicare Subsequent Annual Wellness visit (Pt already had Initial Annual Wellness) and scheduled follow up of multiple significant but stable problems.   Chief Complaint   Patient presents with    Physical     Stable and doing well. Former patient of dr antony.      Patient is here today for physical exam, patient is up-to-date with age-related standard of care screening and vaccination recommendations, this was discussed at length and they verbalized understanding of any deficiencies.    Patient presents for a Medicare Subsequent Annual Wellness visit and  followup regarding chronic medical problems and/or refills as listed below in the assessment and plan    Weight gain: Patient claims he has gained extreme amount of weight over the past few years since the quarantine, he has completed an extreme diet in the past which has got him down to 150 pounds, he admits to poor eating habits, no exercise routine, and not feeling well, his blood pressure is elevated as well.  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    Obstructive sleep apnea: Patient seems to be stable, doing very well on apnea treatment, compliant with his mask.    Environmental allergies: Patient has a long history of environmental allergies, and currently he is using nasal spray, mild second-generation antihistamines to control his symptoms.  Compliant with them daily, takes this most of the year.    History/Other:   Fall Risk Assessment:   He has been screened for Falls and is low risk.      Cognitive Assessment:   Cognitive Assessment     What day of the week is this?: Correct  What month is it?: Correct  What year is it?: Correct  Recall \"Ball\": Correct   Recall \"Flag\": Correct   Recall \"Tree\": Correct          Functional Ability/Status:   Noel Junior has a completely normal functional assessment. See flowsheet  for details.      Depression Screening (PHQ-2/PHQ-9): PHQ-2 SCORE: 0  , done 4/9/2024             Advanced Directives:   He does NOT have a Living Will. [Do you have a living will?: No]  He does NOT have a Power of  for Health Care. [Do you have a healthcare power of ?: No]  Discussed Advance Care Planning with patient (and family/surrogate if present). Standard forms made available to patient in After Visit Summary.      Patient Active Problem List   Diagnosis    Primary osteoarthritis of left knee    Panic attack    MIRELA on CPAP    Anxiety    Gastroesophageal reflux disease without esophagitis    Primary hypertension    Environmental allergies    Nocturia    Primary osteoarthritis of both feet    Class 3 severe obesity with serious comorbidity and body mass index (BMI) of 45.0 to 49.9 in adult (HCC)    Lumbar radiculopathy     Allergies:  He has No Known Allergies.    Current Medications:  Outpatient Medications Marked as Taking for the 4/9/24 encounter (Office Visit) with D'Amico, Jeff Anthony, DO   Medication Sig    loratadine (CLARITIN) 10 MG Oral Tab Take 1 tablet (10 mg total) by mouth daily.    fluticasone propionate 50 MCG/ACT Nasal Suspension 2 sprays by Each Nare route nightly.    lisinopril-hydroCHLOROthiazide 20-25 MG Oral Tab Take 1 tablet by mouth daily.    Calcium Polycarbophil (FIBER) 625 MG Oral Tab Take by mouth. Twice daily    Cholecalciferol (VITAMIN D) 50 MCG (2000 UT) Oral Cap Take by mouth. Twice daily    famotidine 20 MG Oral Tab Take 1 tablet (20 mg total) by mouth 2 (two) times daily.    tamsulosin 0.4 MG Oral Cap Take by mouth daily.    diclofenac 50 MG Oral Tab EC Take 1 tablet (50 mg total) by mouth 2 (two) times daily.    alprazolam 0.25 MG Oral Tab Take 1 tablet (0.25 mg total) by mouth every 6 (six) hours as needed for Anxiety.       Medical History:  He  has a past medical history of Anxiety state, Primary hypertension (4/9/2024), Rheumatoid arthritis ([facility]), and Sleep  apnea.  Surgical History:  He  has a past surgical history that includes hernia surgery; total knee replacement (Right); total knee replacement (Left, 2017); and colonoscopy (N/A, 3/8/2023).   Family History:  His family history includes ALZHEIMER'S in his mother; Cancer in an other family member; Colon Cancer in his father; Ear Problems in an other family member; Heart Attack in his father.  Social History:  He  reports that he quit smoking about 17 years ago. His smoking use included cigarettes. He has never been exposed to tobacco smoke. He has never used smokeless tobacco. He reports current alcohol use. He reports that he does not use drugs.    Tobacco:  He smoked tobacco in the past but quit greater than 12 months ago.  Social History    Tobacco Use      Smoking status: Former        Years: 23        Types: Cigarettes        Quit date: 3/6/2007        Years since quittin.1        Passive exposure: Never      Smokeless tobacco: Never       CAGE Alcohol Screen:   CAGE screening score of 0 on 2024, showing low risk of alcohol abuse.      Patient Care Team:  D'Amico, Jeff Anthony, DO as PCP - General (Internal Medicine)  Corinne Silva PT as Physical Therapist (Physical Therapy)  Dano Marcelino MD (SURGERY, ORTHOPEDIC)    Review of Systems  Constitutional: Negative for Chills, fatigue, fever, malaise, weight gain and weight loss.  ENMT: Negative for Nasal drainage and sinus pressure.  Eyes: Negative for Vision changes.  Respiratory: Negative for Cough, dyspnea and wheezing.  Cardio: Negative Chest pain and irregular heartbeat/palpitations.  GI: Negative for Abdominal pain, constipation, diarrhea, heartburn, nausea and vomiting.  : Negative for Dysuria and urinary frequency.  Endocrine: Negative for Cold intolerance and heat intolerance.  Neuro: Negative for Gait disturbance and memory impairment.  Psych: Negative for Anxiety and depression.  Integumentary: Negative for Hives and  rash.  MS: Negative muscle weakness. pos for joint pain  Hema/Lymph: Negative Easy bleeding and easy bruising.  Allergic/Immuno: Negative Environmental allergies and food allergies.        Objective:   Physical Exam  PHYSICAL EXAMINATION:  Vital signs: See chart   Gen. exam: Alert and oriented, in no acute distress   HEENT: Pupils equal and reactive to light and accommodation, moist mucous membranes  Neck exam:  Supple with baseline range of motion.  Normal thyroid trachea midline, no JVD  Heart exam: Regular rate and rhythm no murmurs no S3 no S4   Lung exam: No rales no rhonchi no wheezes  Abdominal exam: Soft nontender, nondistended positive bowel sounds are normoactive, obese abdomen  Extremities exam: no clubbing no cyanosis no edema  Skin exam: see wound notes for details, no rashes  Neurological exam: Cranial nerves II through XII intact, no gross deficits  Musculoskeletal exam: Moderate bilateral generalized hand arthritis appreciated, no obvious deformity  : deferred to urology  BP (!) 152/98 (BP Location: Right arm, Patient Position: Sitting, Cuff Size: large)   Pulse 88   Temp 97.8 °F (36.6 °C) (Oral)   Ht 5' 6\" (1.676 m)   Wt 281 lb (127.5 kg)   SpO2 93%   BMI 45.35 kg/m²  Estimated body mass index is 45.35 kg/m² as calculated from the following:    Height as of this encounter: 5' 6\" (1.676 m).    Weight as of this encounter: 281 lb (127.5 kg).    Medicare Hearing Assessment:   Hearing Screening    Screening Method: Whisper Test  Whisper Test Result: Pass         Visual Acuity:   Right Eye Visual Acuity: Corrected Right Eye Chart Acuity: 20/30   Left Eye Visual Acuity: Corrected Left Eye Chart Acuity: 20/30   Both Eyes Visual Acuity: Corrected Both Eyes Chart Acuity: 20/30   Able To Tolerate Visual Acuity: Yes        Assessment & Plan:   Noel Junior is a 68 year old male who presents for a Medicare Assessment.     1. Encounter for annual health examination (Primary)  -     CBC With  Differential With Platelet; Future; Expected date: 04/09/2024  -     Comp Metabolic Panel (14); Future; Expected date: 04/09/2024  -     Lipid Panel; Future; Expected date: 04/09/2024  -     Hemoglobin A1C; Future; Expected date: 04/09/2024  -     Microalb/Creat Ratio, Random Urine; Future; Expected date: 04/09/2024  -     TSH W Reflex To Free T4; Future; Expected date: 04/09/2024  -     Urinalysis with Culture Reflex  -     ELECTROCARDIOGRAM, COMPLETE  2. Anxiety  -     Detailed, Mod Complex (68189)  3. MIRELA on CPAP  -     Detailed, Mod Complex (75233)  4. Primary osteoarthritis of left knee  -     Detailed, Mod Complex (62474)  5. Panic attack  -     Detailed, Mod Complex (30326)  6. Gastroesophageal reflux disease without esophagitis  -     Detailed, Mod Complex (95239)  7. Primary hypertension  -     Detailed, Mod Complex (06683)  -     CT CALCIUM SCORING; Future; Expected date: 04/09/2024  -     Lisinopril-hydroCHLOROthiazide; Take 1 tablet by mouth daily.  Dispense: 30 tablet; Refill: 2  8. Environmental allergies  -     Detailed, Mod Complex (17551)  9. Nocturia  -     Detailed, Mod Complex (47780)  10. Primary osteoarthritis of both feet  -     Detailed, Mod Complex (57666)  11. Class 3 severe obesity with serious comorbidity and body mass index (BMI) of 45.0 to 49.9 in adult, unspecified obesity type (HCC)  -     DIETITIAN EDUCATION INITIAL, DIET (INTERNAL)  -     Detailed, Mod Complex (53355)  12. Lumbar radiculopathy  -     Detailed, Mod Complex (07409)  13. Vitamin D deficiency  -     Vitamin D; Future; Expected date: 04/09/2024  14. Screening PSA (prostate specific antigen)  -     PSA Total, Screen; Future; Expected date: 04/09/2024    The patient indicates understanding of these issues and agrees to the plan.  Reinforced healthy diet, lifestyle, and exercise.      Return in about 3 months (around 7/9/2024).     Jeff Anthony D'Amico, DO, 4/9/2024     Supplementary Documentation:   General Health:  In the  past six months, have you lost more than 10 pounds without trying?: 2 - No  Has your appetite been poor?: No  Type of Diet: Other  How does the patient maintain a good energy level?: Daily Walks  How would you describe your daily physical activity?: Moderate  How would you describe your current health state?: Fair  How do you maintain positive mental well-being?: Social Interaction;Puzzles;Games;Visiting Friends;Visiting Family  On a scale of 0 to 10, with 0 being no pain and 10 being severe pain, what is your pain level?: 0 - (None)  In the past six months, have you experienced urine leakage?: 0-No  At any time do you feel concerned for the safety/well-being of yourself and/or your children, in your home or elsewhere?: No  Have you had any immunizations at another office such as Influenza, Hepatitis B, Tetanus, or Pneumococcal?: No        Noel Junior's SCREENING SCHEDULE   Tests on this list are recommended by your physician but may not be covered, or covered at this frequency, by your insurer.   Please check with your insurance carrier before scheduling to verify coverage.   PREVENTATIVE SERVICES FREQUENCY &  COVERAGE DETAILS LAST COMPLETION DATE   Diabetes Screening    Fasting Blood Sugar / Glucose    One screening every 12 months if never tested or if previously tested but not diagnosed with pre-diabetes   One screening every 6 months if diagnosed with pre-diabetes Lab Results   Component Value Date     (H) 01/08/2022        Cardiovascular Disease Screening    Lipid Panel  Cholesterol  Lipoprotein (HDL)  Triglycerides Covered every 5 years for all Medicare beneficiaries without apparent signs or symptoms of cardiovascular disease Lab Results   Component Value Date    CHOLEST 191 01/08/2022    HDL 64 (H) 01/08/2022     (H) 01/08/2022    TRIG 57 01/08/2022         Electrocardiogram (EKG)   Covered if needed at Welcome to Medicare, and non-screening if indicated for medical reasons 04/09/2024       Ultrasound Screening for Abdominal Aortic Aneurysm (AAA) Covered once in a lifetime for one of the following risk factors    Men who are 65-75 years old and have ever smoked    Anyone with a family history -     Colorectal Cancer Screening  Covered for ages 50-85; only need ONE of the following:    Colonoscopy   Covered every 10 years    Covered every 2 years if patient is at high risk or previous colonoscopy was abnormal 03/08/2023    Health Maintenance   Topic Date Due    Colorectal Cancer Screening  03/08/2028       Flexible Sigmoidoscopy   Covered every 4 years -    Fecal Occult Blood Test Covered annually -   Prostate Cancer Screening    Prostate-Specific Antigen (PSA) Annually Lab Results   Component Value Date    PSA 0.6 03/03/2017     Health Maintenance   Topic Date Due    PSA  06/20/2022      Immunizations    Influenza Covered once per flu season  Please get every year -  No recommendations at this time    Pneumococcal Each vaccine ([id number] & [id number]) covered once after 65 Prevnar 13: -    [id number]: -     Pneumococcal Vaccination(1 of 1 - PCV) Never done    Hepatitis B One screening covered for patients with certain risk factors   -  No recommendations at this time    Tetanus Toxoid Not covered by Medicare Part B unless medically necessary (cut with metal); may be covered with your pharmacy prescription benefits -    Tetanus, Diptheria and Pertusis TD and TDaP Not covered by Medicare Part B -  No recommendations at this time    Zoster Not covered by Medicare Part B; may be covered with your pharmacy  prescription benefits -  No recommendations at this time     Annual Monitoring of Persistent Medications (ACE/ARB, digoxin diuretics, anticonvulsants)    Potassium Annually Lab Results   Component Value Date    K 4.5 01/08/2022         Creatinine   Annually Lab Results   Component Value Date    CREATSERUM 0.97 01/08/2022         BUN Annually Lab Results   Component Value Date    BUN 20 (H)  01/08/2022       Drug Serum Conc Annually No results found for: \"DIGOXIN\", \"DIG\", \"VALP\"

## 2024-04-09 NOTE — PATIENT INSTRUCTIONS
1. Encounter for annual health examination  Physical exam instruction: Improve diet and exercise, complete fasting labs in the near future and you will be called with results 5-7 days after completed, call with questions.  Call the central scheduling number at [phone] to schedule at any of the [facility] locations    - CBC With Differential With Platelet; Future  - Comp Metabolic Panel (14); Future  - Lipid Panel; Future  - Hemoglobin A1C; Future  - Microalb/Creat Ratio, Random Urine; Future  - TSH W Reflex To Free T4; Future  - Urinalysis with Culture Reflex  - ELECTROCARDIOGRAM, COMPLETE: This rhythm at 60 bpm, sinus arrhythmia, nonspecific ST-T wave changes, no changes when compared to previous EKGs    2. Anxiety  I do like the idea of continuing current medications here, you considering a controller medication if the symptoms continue and/or worsen  I will refill the controlled substances, Xanax at the current dosage you are taking, you will have to reach out to me and be alone for the refills when they are needed, unfortunately if I am on vacation you will have to wait for my return before refilled.  - Detailed, Mod Complex (81959)    3. MIRELA on CPAP  Stable cont monitoring and management  - Detailed, Mod Complex (40161)    4. Primary osteoarthritis of left knee  Stable cont monitoring and management  - Detailed, Mod Complex (66885)    5. Panic attack  As above  - Detailed, Mod Complex (39934)    6. Gastroesophageal reflux disease without esophagitis  Continue current famotidine  - Detailed, Mod Complex (70783)    7. Primary hypertension  I do like the idea of going on a touch of blood pressure medication may be starting with half dosage for the first few days, then up to full dosage after that, monitoring the blood pressure for any side effects, getting the lab work done and going from there, checking in with me in 3 months in the office here.  - Detailed, Mod Complex (85946)  - CT CALCIUM SCORING; Future  -  lisinopril-hydroCHLOROthiazide 20-25 MG Oral Tab; Take 1 tablet by mouth daily.  Dispense: 30 tablet; Refill: 2    8. Environmental allergies  Stable continue current allergy medications, Flonase spray  - Detailed, Mod Complex (48960)    9. Nocturia  Stable continue current tamsulosin  - Detailed, Mod Complex (87582)    10. Primary osteoarthritis of both feet  Stable continue current monitoring management  - Detailed, Mod Complex (90774)    11. Class 3 severe obesity with serious comorbidity and body mass index (BMI) of 45.0 to 49.9 in adult, unspecified obesity type (HCC)  Ms. Suyapa Prajapati, and her phone number is [phone]  Or the [facility] dietician  - DIETITIAN EDUCATION INITIAL, DIET (INTERNAL)  - Detailed, Mod Complex (08941)    12. Lumbar radiculopathy  Stable continue current monitoring management  - Detailed, Mod Complex (51485)    13. Vitamin D deficiency  Stable continue current monitoring management  - Vitamin D; Future    14. Screening PSA (prostate specific antigen)  Stable continue current monitoring management  - PSA Total, Screen; Future

## 2024-04-10 ENCOUNTER — TELEPHONE (OUTPATIENT)
Dept: INTERNAL MEDICINE CLINIC | Facility: CLINIC | Age: 69
End: 2024-04-10

## 2024-04-10 ENCOUNTER — LAB ENCOUNTER (OUTPATIENT)
Dept: LAB | Age: 69
End: 2024-04-10
Attending: INTERNAL MEDICINE
Payer: MEDICARE

## 2024-04-10 DIAGNOSIS — E55.9 VITAMIN D DEFICIENCY: ICD-10-CM

## 2024-04-10 DIAGNOSIS — Z00.00 ENCOUNTER FOR ANNUAL HEALTH EXAMINATION: ICD-10-CM

## 2024-04-10 DIAGNOSIS — Z12.5 SCREENING PSA (PROSTATE SPECIFIC ANTIGEN): ICD-10-CM

## 2024-04-10 LAB
ALBUMIN SERPL-MCNC: 4.3 G/DL (ref 3.2–4.8)
ALBUMIN/GLOB SERPL: 1.4 {RATIO} (ref 1–2)
ALP LIVER SERPL-CCNC: 83 U/L
ALT SERPL-CCNC: 21 U/L
ANION GAP SERPL CALC-SCNC: 6 MMOL/L (ref 0–18)
AST SERPL-CCNC: 16 U/L (ref ?–34)
BASOPHILS # BLD AUTO: 0.08 X10(3) UL (ref 0–0.2)
BASOPHILS NFR BLD AUTO: 1 %
BILIRUB SERPL-MCNC: 1.4 MG/DL (ref 0.2–1.1)
BILIRUB UR QL: NEGATIVE
BUN BLD-MCNC: 18 MG/DL (ref 9–23)
BUN/CREAT SERPL: 15.3 (ref 10–20)
CALCIUM BLD-MCNC: 9.5 MG/DL (ref 8.7–10.4)
CHLORIDE SERPL-SCNC: 107 MMOL/L (ref 98–112)
CHOLEST SERPL-MCNC: 175 MG/DL (ref ?–200)
CLARITY UR: CLEAR
CO2 SERPL-SCNC: 26 MMOL/L (ref 21–32)
COMPLEXED PSA SERPL-MCNC: 0.36 NG/ML (ref ?–4)
CREAT BLD-MCNC: 1.18 MG/DL
CREAT UR-SCNC: 29.5 MG/DL
DEPRECATED RDW RBC AUTO: 42.8 FL (ref 35.1–46.3)
EGFRCR SERPLBLD CKD-EPI 2021: 68 ML/MIN/1.73M2 (ref 60–?)
EOSINOPHIL # BLD AUTO: 0.27 X10(3) UL (ref 0–0.7)
EOSINOPHIL NFR BLD AUTO: 3.3 %
ERYTHROCYTE [DISTWIDTH] IN BLOOD BY AUTOMATED COUNT: 13.5 % (ref 11–15)
EST. AVERAGE GLUCOSE BLD GHB EST-MCNC: 117 MG/DL (ref 68–126)
FASTING PATIENT LIPID ANSWER: YES
FASTING STATUS PATIENT QL REPORTED: YES
GLOBULIN PLAS-MCNC: 3.1 G/DL (ref 2.8–4.4)
GLUCOSE BLD-MCNC: 98 MG/DL (ref 70–99)
GLUCOSE UR-MCNC: NORMAL MG/DL
HBA1C MFR BLD: 5.7 % (ref ?–5.7)
HCT VFR BLD AUTO: 43.1 %
HDLC SERPL-MCNC: 47 MG/DL (ref 40–59)
HGB BLD-MCNC: 14.3 G/DL
HGB UR QL STRIP.AUTO: NEGATIVE
IMM GRANULOCYTES # BLD AUTO: 0.02 X10(3) UL (ref 0–1)
IMM GRANULOCYTES NFR BLD: 0.2 %
KETONES UR-MCNC: NEGATIVE MG/DL
LDLC SERPL CALC-MCNC: 114 MG/DL (ref ?–100)
LEUKOCYTE ESTERASE UR QL STRIP.AUTO: NEGATIVE
LYMPHOCYTES # BLD AUTO: 1.28 X10(3) UL (ref 1–4)
LYMPHOCYTES NFR BLD AUTO: 15.5 %
MCH RBC QN AUTO: 28.8 PG (ref 26–34)
MCHC RBC AUTO-ENTMCNC: 33.2 G/DL (ref 31–37)
MCV RBC AUTO: 86.9 FL
MICROALBUMIN UR-MCNC: <0.3 MG/DL
MONOCYTES # BLD AUTO: 0.61 X10(3) UL (ref 0.1–1)
MONOCYTES NFR BLD AUTO: 7.4 %
NEUTROPHILS # BLD AUTO: 6.01 X10 (3) UL (ref 1.5–7.7)
NEUTROPHILS # BLD AUTO: 6.01 X10(3) UL (ref 1.5–7.7)
NEUTROPHILS NFR BLD AUTO: 72.6 %
NITRITE UR QL STRIP.AUTO: NEGATIVE
NONHDLC SERPL-MCNC: 128 MG/DL (ref ?–130)
OSMOLALITY SERPL CALC.SUM OF ELEC: 290 MOSM/KG (ref 275–295)
PH UR: 7 [PH] (ref 5–8)
PLATELET # BLD AUTO: 224 10(3)UL (ref 150–450)
POTASSIUM SERPL-SCNC: 4.3 MMOL/L (ref 3.5–5.1)
PROT SERPL-MCNC: 7.4 G/DL (ref 5.7–8.2)
PROT UR-MCNC: NEGATIVE MG/DL
RBC # BLD AUTO: 4.96 X10(6)UL
SODIUM SERPL-SCNC: 139 MMOL/L (ref 136–145)
SP GR UR STRIP: 1.01 (ref 1–1.03)
TRIGL SERPL-MCNC: 74 MG/DL (ref 30–149)
TSI SER-ACNC: 1.61 MIU/ML (ref 0.55–4.78)
UROBILINOGEN UR STRIP-ACNC: NORMAL
VIT D+METAB SERPL-MCNC: 50 NG/ML (ref 30–100)
VLDLC SERPL CALC-MCNC: 13 MG/DL (ref 0–30)
WBC # BLD AUTO: 8.3 X10(3) UL (ref 4–11)

## 2024-04-10 PROCEDURE — 81003 URINALYSIS AUTO W/O SCOPE: CPT | Performed by: INTERNAL MEDICINE

## 2024-04-10 PROCEDURE — 82306 VITAMIN D 25 HYDROXY: CPT

## 2024-04-10 PROCEDURE — 80061 LIPID PANEL: CPT

## 2024-04-10 PROCEDURE — 84443 ASSAY THYROID STIM HORMONE: CPT

## 2024-04-10 PROCEDURE — 36415 COLL VENOUS BLD VENIPUNCTURE: CPT

## 2024-04-10 PROCEDURE — 82570 ASSAY OF URINE CREATININE: CPT

## 2024-04-10 PROCEDURE — 83036 HEMOGLOBIN GLYCOSYLATED A1C: CPT

## 2024-04-10 PROCEDURE — 80053 COMPREHEN METABOLIC PANEL: CPT

## 2024-04-10 PROCEDURE — 82043 UR ALBUMIN QUANTITATIVE: CPT

## 2024-04-10 PROCEDURE — 85025 COMPLETE CBC W/AUTO DIFF WBC: CPT

## 2024-04-10 NOTE — TELEPHONE ENCOUNTER
To MAGI Hidalgo.. In regards to the [employer] message copied below.    I called pt and informed him that medication order does not indicate that it is enteric coated. Pt advised to call Pharmacist and ask if Lisinopril/hydrochlorothiazide can be cut in half as you instructed him to do. Pt instructed to take Rx as you advised.    From: Noel Junior  To: Jeff Anthony D'Amico  Sent: 4/10/2024  1:36 PM CDT  Subject: Lisinopril/HCTZ    Hi ,  I picked up the Lisinopril today and the tablets anre  enteric coated so I am not able to cut them in half.  You had wanted me to take 1/2 tab for the first 4 days, I will have to start with a whole tab.  Just wanted to let you know.

## 2024-04-10 NOTE — TELEPHONE ENCOUNTER
----- Message from Noel Junior sent at 4/10/2024  1:37 PM CDT -----  Regarding: Lisinopril/HCTZ  Contact: [phone]  Jerardo

## 2024-04-10 NOTE — TELEPHONE ENCOUNTER
----- Message from Noel Junior sent at 4/10/2024  1:37 PM CDT -----  Regarding: Lisinopril/HCTZ  Contact: [phone]  Jerardo

## 2024-04-10 NOTE — TELEPHONE ENCOUNTER
From: Noel Junior  To: Jeff Anthony D'Amico  Sent: 4/10/2024  1:36 PM CDT  Subject: Lisinopril/HCTZ    Antonio Maher,  I picked up the Lisinopril today and the tablets anre  enteric coated so I am not able to cut them in half.  You had wanted me to take 1/2 tab for the first 4 days, I will have to start with a whole tab.  Just wanted to let you know.

## 2024-04-16 ENCOUNTER — PATIENT MESSAGE (OUTPATIENT)
Dept: INTERNAL MEDICINE CLINIC | Facility: CLINIC | Age: 69
End: 2024-04-16

## 2024-04-17 NOTE — TELEPHONE ENCOUNTER
From: Noel Junior  To: Jeff Anthony D'Amico  Sent: 4/16/2024 6:29 PM CDT  Subject: Your message regarding my lab results.     Antonio Maher, I tried to respond to your message in the lab result page but it wouldn’t let me. Anyways, awesome news glad to hear it. Been monitoring my BP at home and it is definitely going down since starting the med. Thanks again for getting back to me about my labs, I appreciate it. Take care, Jerardo

## 2024-07-04 DIAGNOSIS — I10 PRIMARY HYPERTENSION: ICD-10-CM

## 2024-07-05 RX ORDER — LISINOPRIL AND HYDROCHLOROTHIAZIDE 25; 20 MG/1; MG/1
1 TABLET ORAL DAILY
Qty: 90 TABLET | Refills: 0 | OUTPATIENT
Start: 2024-07-05

## 2024-07-05 NOTE — TELEPHONE ENCOUNTER
Deanna noted    Current refill request refused due to refill is either a duplicate request or has active refills at the pharmacy.  Check previous templates.    Requested Prescriptions     Refused Prescriptions Disp Refills    LISINOPRIL-HYDROCHLOROTHIAZIDE 20-25 MG Oral Tab [Pharmacy Med Name: LISINOPRIL-HCTZ 20-25 MG TAB] 90 tablet 0     Sig: TAKE 1 TABLET BY MOUTH EVERY DAY     Refused By: DANK LOZADA     Reason for Refusal: Refill not appropriate

## 2024-07-09 ENCOUNTER — OFFICE VISIT (OUTPATIENT)
Dept: INTERNAL MEDICINE CLINIC | Facility: CLINIC | Age: 69
End: 2024-07-09

## 2024-07-09 VITALS
WEIGHT: 276.19 LBS | DIASTOLIC BLOOD PRESSURE: 70 MMHG | TEMPERATURE: 98 F | OXYGEN SATURATION: 97 % | SYSTOLIC BLOOD PRESSURE: 124 MMHG | BODY MASS INDEX: 44.39 KG/M2 | HEIGHT: 66 IN | HEART RATE: 80 BPM

## 2024-07-09 DIAGNOSIS — I10 PRIMARY HYPERTENSION: Primary | ICD-10-CM

## 2024-07-09 DIAGNOSIS — E66.01 CLASS 3 SEVERE OBESITY WITH SERIOUS COMORBIDITY AND BODY MASS INDEX (BMI) OF 45.0 TO 49.9 IN ADULT, UNSPECIFIED OBESITY TYPE (HCC): ICD-10-CM

## 2024-07-09 DIAGNOSIS — R73.09 ELEVATED HEMOGLOBIN A1C: ICD-10-CM

## 2024-07-09 DIAGNOSIS — Z51.81 MEDICATION MONITORING ENCOUNTER: ICD-10-CM

## 2024-07-09 PROCEDURE — 3074F SYST BP LT 130 MM HG: CPT | Performed by: INTERNAL MEDICINE

## 2024-07-09 PROCEDURE — 99214 OFFICE O/P EST MOD 30 MIN: CPT | Performed by: INTERNAL MEDICINE

## 2024-07-09 PROCEDURE — 3078F DIAST BP <80 MM HG: CPT | Performed by: INTERNAL MEDICINE

## 2024-07-09 PROCEDURE — 1126F AMNT PAIN NOTED NONE PRSNT: CPT | Performed by: INTERNAL MEDICINE

## 2024-07-09 PROCEDURE — 1159F MED LIST DOCD IN RCRD: CPT | Performed by: INTERNAL MEDICINE

## 2024-07-09 PROCEDURE — 3008F BODY MASS INDEX DOCD: CPT | Performed by: INTERNAL MEDICINE

## 2024-07-09 PROCEDURE — 1160F RVW MEDS BY RX/DR IN RCRD: CPT | Performed by: INTERNAL MEDICINE

## 2024-07-09 RX ORDER — LISINOPRIL AND HYDROCHLOROTHIAZIDE 25; 20 MG/1; MG/1
1 TABLET ORAL DAILY
Qty: 90 TABLET | Refills: 3 | Status: SHIPPED | OUTPATIENT
Start: 2024-07-09

## 2024-07-09 RX ORDER — ROSUVASTATIN CALCIUM 5 MG/1
5 TABLET, COATED ORAL DAILY
Qty: 90 TABLET | Refills: 3 | Status: SHIPPED | OUTPATIENT
Start: 2024-07-09

## 2024-07-09 RX ORDER — TIRZEPATIDE 2.5 MG/.5ML
2.5 INJECTION, SOLUTION SUBCUTANEOUS WEEKLY
Qty: 2 ML | Refills: 1 | Status: SHIPPED | OUTPATIENT
Start: 2024-07-09 | End: 2024-07-31

## 2024-07-09 NOTE — PATIENT INSTRUCTIONS
1. Primary hypertension  Stable continue current medications here, nice job on the blood pressure control, refill sent.  - lisinopril-hydroCHLOROthiazide 20-25 MG Oral Tab; Take 1 tablet by mouth daily.  Dispense: 90 tablet; Refill: 3    2. Elevated hemoglobin A1c  I like the idea of initiating rosuvastatin for cardioprotective effects as well, daily, lets try to complete that first, if we want to minimize any side effects that have to do with a statin, most people take co-Q10 along with it 100 mg daily  - rosuvastatin 5 MG Oral Tab; Take 1 tablet (5 mg total) by mouth daily.  Dispense: 90 tablet; Refill: 3  - Tirzepatide-Weight Management (ZEPBOUND) 2.5 MG/0.5ML Subcutaneous Solution Auto-injector; Inject 2.5 mg into the skin once a week for 4 doses.  Dispense: 2 mL; Refill: 1    3. Class 3 severe obesity with serious comorbidity and body mass index (BMI) of 45.0 to 49.9 in adult, unspecified obesity type (HCC)  I like the idea of initiating the GLP-1 agonist, we may have to use to help with clinical pharmacist here as well, I need reports by [employer] every month on the total weight loss, and how much weight is lost you have gotten 7 days prior to your message.  Lets see if we can initiate these, get through the acquisition issues, let me know if I need to help you with those  Lets start these on a Sunday, this Sunday at the earliest  - Tirzepatide-Weight Management (ZEPBOUND) 2.5 MG/0.5ML Subcutaneous Solution Auto-injector; Inject 2.5 mg into the skin once a week for 4 doses.  Dispense: 2 mL; Refill: 1    4. Medication monitoring encounter  Lab work in 3 months, fasting if we can  - CBC With Differential With Platelet; Future  - Comp Metabolic Panel (14); Future  - Hemoglobin A1C; Future

## 2024-07-09 NOTE — PROGRESS NOTES
HPI:   Noel Junior is a 69 year old male who presents for complains of:   Chief Complaint   Patient presents with    Checkup     3 month, discuss weight loss     HTN     Stable on meds and doing well with new bp meds.     Hypertension: Patient seems stable, doing well, uptitrated his blood pressure medicine has done very well, blood pressure very well-controlled at this point, is compliant with medications, needs refills      Family history of coronary artery disease, elevated hemoglobin A1c: Patient does have a family history of coronary artery disease and a father that had an MI in his mid 70s.  We did discuss this at length, previous cholesterol numbers to look well-controlled, calcium scoring test has not been completed, he was remorseful of that.    EXAM:   /70   Pulse 80   Temp 98.3 °F (36.8 °C)   Ht 5' 6\" (1.676 m)   Wt 276 lb 3.2 oz (125.3 kg)   SpO2 97%   BMI 44.58 kg/m²   Body mass index is 44.58 kg/m².   Gen. exam: Alert and oriented, in no acute distress   HEENT: Pupils equal and reactive to light and accommodation, moist mucous membranes  Neck exam:  Supple.  Normal thyroid trachea midline, no JVD  Heart exam: Regular rate and rhythm no murmurs no S3 no S4   Lung exam: No rales no rhonchi no wheezes  Abdominal exam: Soft, nontender, nondistended, positive bowel sounds are normoactive, obese abdomen  Extremities exam: no clubbing, no cyanosis, no edema  Skin exam: No obvious wounds, no rashes  Neurological exam: Cranial nerves II through XII intact, no gross deficits  Musculoskeletal exam: Mild bilateral generalized hand arthritis appreciated, no obvious deformity    ASSESSMENT AND PLAN:   Noel Junior is a 69 year old male who presents with the followin. Primary hypertension  Stable continue current medications here, nice job on the blood pressure control, refill sent.  Lets set up the calcium scoring test  Central scheduling phone number is [phone] -  lisinopril-hydroCHLOROthiazide 20-25 MG Oral Tab; Take 1 tablet by mouth daily.  Dispense: 90 tablet; Refill: 3    2. Elevated hemoglobin A1c  I like the idea of initiating rosuvastatin for cardioprotective effects as well, daily, lets try to complete that first, if we want to minimize any side effects that have to do with a statin, most people take co-Q10 along with it 100 mg daily  - rosuvastatin 5 MG Oral Tab; Take 1 tablet (5 mg total) by mouth daily.  Dispense: 90 tablet; Refill: 3  - Tirzepatide-Weight Management (ZEPBOUND) 2.5 MG/0.5ML Subcutaneous Solution Auto-injector; Inject 2.5 mg into the skin once a week for 4 doses.  Dispense: 2 mL; Refill: 1    3. Class 3 severe obesity with serious comorbidity and body mass index (BMI) of 45.0 to 49.9 in adult, unspecified obesity type (HCC)  I like the idea of initiating the GLP-1 agonist, we may have to use to help with clinical pharmacist here as well, I need reports by [employer] every month on the total weight loss, and how much weight is lost you have gotten 7 days prior to your message.  Lets see if we can initiate these, get through the acquisition issues, let me know if I need to help you with those  Lets start these on a Sunday, this Sunday at the earliest  - Tirzepatide-Weight Management (ZEPBOUND) 2.5 MG/0.5ML Subcutaneous Solution Auto-injector; Inject 2.5 mg into the skin once a week for 4 doses.  Dispense: 2 mL; Refill: 1    4. Medication monitoring encounter  Lab work in 3 months, fasting if we can  - CBC With Differential With Platelet; Future  - Comp Metabolic Panel (14); Future  - Hemoglobin A1C; Future        Spent 30 minutes obtaining history, evaluating patient, discussing treatment options, diet, exercise, review of available labs and radiology reports, and completing documentation.    Jeff Anthony D'Amico, DO  7/9/2024  12:49 PM

## 2024-10-17 ENCOUNTER — TELEPHONE (OUTPATIENT)
Dept: INTERNAL MEDICINE CLINIC | Facility: CLINIC | Age: 69
End: 2024-10-17

## 2024-10-18 NOTE — TELEPHONE ENCOUNTER
Discussed GLP with pt - currently not covered;  he is retiring next week;  he has already signed up for [employer];   Suyapa Prajapati was cost prohibitive   He will check with insurance in the new year if there are any changes

## 2025-01-06 ENCOUNTER — LAB ENCOUNTER (OUTPATIENT)
Dept: LAB | Age: 70
End: 2025-01-06
Attending: INTERNAL MEDICINE
Payer: MEDICARE

## 2025-01-06 DIAGNOSIS — Z51.81 MEDICATION MONITORING ENCOUNTER: ICD-10-CM

## 2025-01-06 DIAGNOSIS — E66.01 CLASS 3 SEVERE OBESITY WITH SERIOUS COMORBIDITY AND BODY MASS INDEX (BMI) OF 45.0 TO 49.9 IN ADULT, UNSPECIFIED OBESITY TYPE (HCC): ICD-10-CM

## 2025-01-06 DIAGNOSIS — E66.813 CLASS 3 SEVERE OBESITY WITH SERIOUS COMORBIDITY AND BODY MASS INDEX (BMI) OF 45.0 TO 49.9 IN ADULT, UNSPECIFIED OBESITY TYPE (HCC): ICD-10-CM

## 2025-01-06 LAB
ALBUMIN SERPL-MCNC: 4.2 G/DL (ref 3.2–4.8)
ALBUMIN/GLOB SERPL: 1.4 {RATIO} (ref 1–2)
ALP LIVER SERPL-CCNC: 80 U/L
ALT SERPL-CCNC: 24 U/L
ANION GAP SERPL CALC-SCNC: 8 MMOL/L (ref 0–18)
AST SERPL-CCNC: 16 U/L (ref ?–34)
BASOPHILS # BLD AUTO: 0.11 X10(3) UL (ref 0–0.2)
BASOPHILS NFR BLD AUTO: 1.1 %
BILIRUB SERPL-MCNC: 0.8 MG/DL (ref 0.2–1.1)
BUN BLD-MCNC: 25 MG/DL (ref 9–23)
BUN/CREAT SERPL: 18.4 (ref 10–20)
CALCIUM BLD-MCNC: 9.3 MG/DL (ref 8.7–10.4)
CHLORIDE SERPL-SCNC: 107 MMOL/L (ref 98–112)
CHOLEST SERPL-MCNC: 135 MG/DL (ref ?–200)
CO2 SERPL-SCNC: 28 MMOL/L (ref 21–32)
CREAT BLD-MCNC: 1.36 MG/DL
DEPRECATED RDW RBC AUTO: 43.5 FL (ref 35.1–46.3)
EGFRCR SERPLBLD CKD-EPI 2021: 57 ML/MIN/1.73M2 (ref 60–?)
EOSINOPHIL # BLD AUTO: 0.29 X10(3) UL (ref 0–0.7)
EOSINOPHIL NFR BLD AUTO: 3 %
ERYTHROCYTE [DISTWIDTH] IN BLOOD BY AUTOMATED COUNT: 13 % (ref 11–15)
EST. AVERAGE GLUCOSE BLD GHB EST-MCNC: 123 MG/DL (ref 68–126)
FASTING PATIENT LIPID ANSWER: YES
FASTING STATUS PATIENT QL REPORTED: YES
GLOBULIN PLAS-MCNC: 2.9 G/DL (ref 2–3.5)
GLUCOSE BLD-MCNC: 111 MG/DL (ref 70–99)
HBA1C MFR BLD: 5.9 % (ref ?–5.7)
HCT VFR BLD AUTO: 38.6 %
HDLC SERPL-MCNC: 43 MG/DL (ref 40–59)
HGB BLD-MCNC: 13.1 G/DL
IMM GRANULOCYTES # BLD AUTO: 0.03 X10(3) UL (ref 0–1)
IMM GRANULOCYTES NFR BLD: 0.3 %
LDLC SERPL CALC-MCNC: 79 MG/DL (ref ?–100)
LYMPHOCYTES # BLD AUTO: 1.42 X10(3) UL (ref 1–4)
LYMPHOCYTES NFR BLD AUTO: 14.7 %
MCH RBC QN AUTO: 31 PG (ref 26–34)
MCHC RBC AUTO-ENTMCNC: 33.9 G/DL (ref 31–37)
MCV RBC AUTO: 91.5 FL
MONOCYTES # BLD AUTO: 0.66 X10(3) UL (ref 0.1–1)
MONOCYTES NFR BLD AUTO: 6.8 %
NEUTROPHILS # BLD AUTO: 7.16 X10 (3) UL (ref 1.5–7.7)
NEUTROPHILS # BLD AUTO: 7.16 X10(3) UL (ref 1.5–7.7)
NEUTROPHILS NFR BLD AUTO: 74.1 %
NONHDLC SERPL-MCNC: 92 MG/DL (ref ?–130)
OSMOLALITY SERPL CALC.SUM OF ELEC: 301 MOSM/KG (ref 275–295)
PLATELET # BLD AUTO: 200 10(3)UL (ref 150–450)
POTASSIUM SERPL-SCNC: 4.5 MMOL/L (ref 3.5–5.1)
PROT SERPL-MCNC: 7.1 G/DL (ref 5.7–8.2)
RBC # BLD AUTO: 4.22 X10(6)UL
SODIUM SERPL-SCNC: 143 MMOL/L (ref 136–145)
TRIGL SERPL-MCNC: 63 MG/DL (ref 30–149)
VLDLC SERPL CALC-MCNC: 10 MG/DL (ref 0–30)
WBC # BLD AUTO: 9.7 X10(3) UL (ref 4–11)

## 2025-01-06 PROCEDURE — 80061 LIPID PANEL: CPT

## 2025-01-06 PROCEDURE — 80053 COMPREHEN METABOLIC PANEL: CPT

## 2025-01-06 PROCEDURE — 83036 HEMOGLOBIN GLYCOSYLATED A1C: CPT

## 2025-01-06 PROCEDURE — 85025 COMPLETE CBC W/AUTO DIFF WBC: CPT

## 2025-01-06 PROCEDURE — 36415 COLL VENOUS BLD VENIPUNCTURE: CPT

## 2025-01-09 ENCOUNTER — OFFICE VISIT (OUTPATIENT)
Dept: INTERNAL MEDICINE CLINIC | Facility: CLINIC | Age: 70
End: 2025-01-09
Payer: MEDICARE

## 2025-01-09 VITALS
WEIGHT: 276 LBS | HEIGHT: 66 IN | TEMPERATURE: 97 F | DIASTOLIC BLOOD PRESSURE: 76 MMHG | HEART RATE: 95 BPM | SYSTOLIC BLOOD PRESSURE: 110 MMHG | BODY MASS INDEX: 44.36 KG/M2 | OXYGEN SATURATION: 96 %

## 2025-01-09 DIAGNOSIS — M19.071 PRIMARY OSTEOARTHRITIS OF BOTH FEET: ICD-10-CM

## 2025-01-09 DIAGNOSIS — M19.072 PRIMARY OSTEOARTHRITIS OF BOTH FEET: ICD-10-CM

## 2025-01-09 DIAGNOSIS — G47.33 OSA ON CPAP: ICD-10-CM

## 2025-01-09 DIAGNOSIS — M17.12 PRIMARY OSTEOARTHRITIS OF LEFT KNEE: ICD-10-CM

## 2025-01-09 DIAGNOSIS — F41.9 ANXIETY: ICD-10-CM

## 2025-01-09 DIAGNOSIS — Z91.09 ENVIRONMENTAL ALLERGIES: ICD-10-CM

## 2025-01-09 DIAGNOSIS — R35.1 NOCTURIA: ICD-10-CM

## 2025-01-09 DIAGNOSIS — I10 PRIMARY HYPERTENSION: ICD-10-CM

## 2025-01-09 DIAGNOSIS — F41.0 PANIC ATTACK: ICD-10-CM

## 2025-01-09 DIAGNOSIS — E66.01 CLASS 3 SEVERE OBESITY WITH SERIOUS COMORBIDITY AND BODY MASS INDEX (BMI) OF 45.0 TO 49.9 IN ADULT, UNSPECIFIED OBESITY TYPE (HCC): ICD-10-CM

## 2025-01-09 DIAGNOSIS — K21.9 GASTROESOPHAGEAL REFLUX DISEASE WITHOUT ESOPHAGITIS: ICD-10-CM

## 2025-01-09 DIAGNOSIS — R73.09 ELEVATED HEMOGLOBIN A1C: ICD-10-CM

## 2025-01-09 DIAGNOSIS — E66.813 CLASS 3 SEVERE OBESITY WITH SERIOUS COMORBIDITY AND BODY MASS INDEX (BMI) OF 45.0 TO 49.9 IN ADULT, UNSPECIFIED OBESITY TYPE (HCC): ICD-10-CM

## 2025-01-09 DIAGNOSIS — Z00.00 ENCOUNTER FOR ANNUAL HEALTH EXAMINATION: Primary | ICD-10-CM

## 2025-01-09 DIAGNOSIS — M54.16 LUMBAR RADICULOPATHY: ICD-10-CM

## 2025-01-09 PROBLEM — N18.30 CKD (CHRONIC KIDNEY DISEASE) STAGE 3, GFR 30-59 ML/MIN (HCC): Chronic | Status: ACTIVE | Noted: 2025-01-09

## 2025-01-09 RX ORDER — IPRATROPIUM BROMIDE 21 UG/1
2 SPRAY, METERED NASAL EVERY 12 HOURS
COMMUNITY

## 2025-01-09 NOTE — PATIENT INSTRUCTIONS
1. Encounter for annual health examination  Physical exam instruction: Improve diet and exercise    2. Anxiety  Stable, lets keep on the Xanax as needed, and let me know if you need refills, I did submit an order to the [employer] navigator, lets make sure we entertain their phone call, get ourselves a counselor and go from there  - Detailed, Mod Complex (31207)    3. Gastroesophageal reflux disease without esophagitis  Stable continue current medications here  - Detailed, Mod Complex (29350)    4. Primary hypertension  Stable continue current monitoring management  - Detailed, Mod Complex (88232)    5. Class 3 severe obesity with serious comorbidity and body mass index (BMI) of 45.0 to 49.9 in adult, unspecified obesity type (HCC)  I do like the idea of you being aggressive your, Jardiance should help, since we are decided to start that, see below, and maybe consider using one of the compounding GLP-1 providers, as long as we can get this for a reasonable price.  I do think it could be useful for you  - Truman, Mod Complex (53355)    6. Lumbar radiculopathy  Stable, let me know if we need to go further here with your back if it continues to bother you, weight loss I do think will certainly help  - [employer] Navigator  - Detailed, Mod Complex (52986)    7. Primary osteoarthritis of both feet  Stable, continue current monitor management, home medications  - Detailed, Mod Complex (08842)    8. Nocturia  Stable, continue current monitor management, home medications  - Detailed, Mod Complex (02853)    9. Environmental allergies  Stable, continue current monitor management, home medications  - Detailed, Mod Complex (92629)    10. MIRELA on CPAP  Stable, continue current monitor management, home medications  - Detailed, Mod Complex (86288)    11. Panic attack  Stable, continue current monitor management, home medications  - [employer] Navigator  - Detailed, Mod Complex (86807)    12. Primary osteoarthritis of left  knee  Stable, continue current monitor management, home medications  - Detailed, Mod Complex (14253)    13. Elevated hemoglobin A1c  Stable, continue current monitor management, home medications.  I do like the idea of restarting high half dosage of Jardiance here, then going up to full dosage in 1 to 2 weeks, I am hoping her insurance has some coverage for this, we can go from there.  Watch your blood pressure while you are on this for a few weeks we may have to start lowering that dosage of medication as well.  - empagliflozin 25 MG Oral Tab; Take 1 tablet (25 mg total) by mouth daily.  Dispense: 90 tablet; Refill: 1
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
## 2025-01-13 ENCOUNTER — TELEPHONE (OUTPATIENT)
Age: 70
End: 2025-01-13

## 2025-01-13 ENCOUNTER — PATIENT MESSAGE (OUTPATIENT)
Dept: INTERNAL MEDICINE CLINIC | Facility: CLINIC | Age: 70
End: 2025-01-13

## 2025-01-13 NOTE — TELEPHONE ENCOUNTER
Jolene Cohen,    Here are some therapy resources that may be a good fit. Please verify your insurance coverage with any providers that you may choose to call and schedule with directly. If there is anything else I can assist with, then please give me a call at [phone]. If you need more immediate assistance, or assistance outside of business hours, please contact the [facility] 24/7 helpline at [phone].     [facility]  [facility]  [address]  [address]  [place] [address]  Phone: [phone]    Yimi Soto LCSW or oLu Juárez LCSW  [facility]  [address]  [address]  [place] [address]  Phone: [phone]    Ana Man LCSW  [facility]  [address] [place] [address]   Phone: [phone]    [facility]  [address]  [address]  [place] [address]   Phone: [phone]    [address]  [address]  [place] [address]  Phone: [phone]      Lore Ramirez (she/her/hers)  Patient Care Navigator Mental Health   [facility]/Mental Health Division  [phone] or 24/7 help line: [phone]  [email or web address]  Request an assessment or support »

## 2025-01-13 NOTE — TELEPHONE ENCOUNTER
Hello - I am reaching out from the [facility] Navigation department, following up on an order from your provider's office to assist in connecting you with resources for care. If you would like to discuss this further, please give us a call at [phone], or for more immediate assistance you can contact our 24-hour help line at [phone]. We look forward to hearing from you soon.

## 2025-01-27 ENCOUNTER — TELEPHONE (OUTPATIENT)
Age: 70
End: 2025-01-27

## 2025-02-04 ENCOUNTER — TELEPHONE (OUTPATIENT)
Dept: INTERNAL MEDICINE CLINIC | Facility: CLINIC | Age: 70
End: 2025-02-04

## 2025-02-04 DIAGNOSIS — F41.0 PANIC ATTACK: ICD-10-CM

## 2025-02-04 RX ORDER — ALPRAZOLAM 0.25 MG/1
0.25 TABLET ORAL EVERY 6 HOURS PRN
Qty: 40 TABLET | Refills: 5 | Status: SHIPPED | OUTPATIENT
Start: 2025-02-04

## 2025-02-04 NOTE — TELEPHONE ENCOUNTER
To MD:  The above refill request is for a controlled substance.  Please review pended medication order.   Print and sign for staff to fax to pharmacy or prescribe electronically.    Last office visit: 1/9/2025  Last time refill sent and quantity/refills: 8/23/2024 #40 with 5 refills

## 2025-02-14 ENCOUNTER — TELEPHONE (OUTPATIENT)
Dept: INTERNAL MEDICINE CLINIC | Facility: CLINIC | Age: 70
End: 2025-02-14

## 2025-02-14 ENCOUNTER — PATIENT MESSAGE (OUTPATIENT)
Dept: INTERNAL MEDICINE CLINIC | Facility: CLINIC | Age: 70
End: 2025-02-14

## 2025-02-14 NOTE — TELEPHONE ENCOUNTER
To Dr. JAS Huerta received:  \"Hi , I have been taking the Jardiance for several weeks now and have not felt right while taking it. I would get dizzy, light headed and diarrhea. This would start causing anxiety and panic attacks so I stopped taking it. I wanted to let you know and see what your thoughts on this. Thank you, Jerardo \"

## 2025-02-17 ENCOUNTER — PATIENT MESSAGE (OUTPATIENT)
Dept: INTERNAL MEDICINE CLINIC | Facility: CLINIC | Age: 70
End: 2025-02-17

## 2025-02-19 NOTE — TELEPHONE ENCOUNTER
[employer] message sent to the patient, giving him some reassurance to stay off the Jardiance, keep the medication leftover, improving the diet and exercise, and reporting some blood sugars to me in the next 2 to 4 weeks

## 2025-02-21 NOTE — TELEPHONE ENCOUNTER
Pippa, can can you reach out to maría, he has been on mild diabetic meds but had trouble with jardiance, if you can, Jorge is interested with getting a glucometer at home, he may not actually need one with such mild numbers here, but he would he know each other well, and can decide together.  I always like more testing if it is available.

## 2025-02-24 NOTE — TELEPHONE ENCOUNTER
Spoke to pt --- we discussed HgbA1C;  glucometer will not be covered by insurance.   We talked pros/cons of monitoring at this point in time;   TLC discussed at length and he will work on diet/exercise and CHO food content for now

## 2025-02-25 ENCOUNTER — PATIENT MESSAGE (OUTPATIENT)
Dept: INTERNAL MEDICINE CLINIC | Facility: CLINIC | Age: 70
End: 2025-02-25

## 2025-02-25 ENCOUNTER — HOSPITAL ENCOUNTER (OUTPATIENT)
Dept: CT IMAGING | Age: 70
Discharge: HOME | End: 2025-02-25
Attending: INTERNAL MEDICINE

## 2025-02-25 ENCOUNTER — TELEPHONE (OUTPATIENT)
Dept: INTERNAL MEDICINE CLINIC | Facility: CLINIC | Age: 70
End: 2025-02-25

## 2025-02-25 DIAGNOSIS — R73.09 ELEVATED HEMOGLOBIN A1C: ICD-10-CM

## 2025-02-25 DIAGNOSIS — R93.1 ELEVATED CORONARY ARTERY CALCIUM SCORE: Primary | ICD-10-CM

## 2025-02-25 DIAGNOSIS — I10 PRIMARY HYPERTENSION: ICD-10-CM

## 2025-02-25 PROCEDURE — 99214 OFFICE O/P EST MOD 30 MIN: CPT | Performed by: INTERNAL MEDICINE

## 2025-02-25 NOTE — TELEPHONE ENCOUNTER
[facility] Clinical Staff (supporting Jeff Anthony D'Amico, DO)2 minutes ago (2:12 PM)       Antonio Maher, I had the scan done today and the Total Agatson Score was 1519.  The nurse stressed that these are Prelimary Findings.   What do we do next.  She said I should see a Cardiologist and don’t exercise any more until I talk with you.  Thank you Jerardo

## 2025-02-25 NOTE — TELEPHONE ENCOUNTER
Virtual Visit/Telephone Note    Noel Junior verbally consents to a Virtual/Telephone Check-In service on 25  Patient understands and accepts financial responsibility for any deductible, co-insurance and/or co-pays associated with this service.    Duration/time spent of the service: 20 Minutes of direct patient contact.  10 Minutes of chart review, documentation, medical decision making.    HPI:   Noel Junior is a 69 year old male who presents for complains of:   Chief Complaint   Patient presents with    Results     Elevated CT calcium scoring testing patient was notified after CT calcium scoring test have a number of 1529: On the score, they directed him to follow-up with me.  We did have a quick phone conversation, discussed at length the testing, results, and my recommendations, he is asymptomatic at this time for a cardiac condition, does take a statin medication he was instructed to continue medications including the statin, and to increase this at this time he verbalized understanding.  He does not have a family history, he does exercise, and is wondering if he can continue to exercise.  He was discouraged from doing this until he sees the cardiology team.  He does know Dr. Casanova from previous hospital engagements.    Physical exam:   Telephone visit only, no obvious pain no obvious shortness of breath, patient sounds been his usual state of health    ASSESSMENT AND PLAN:   Noel Junior is a 69 year old male who presents with the followin. Elevated coronary artery calcium score  Nice job getting the test done here, with an elevated score I do want you to communicate and see the cardiology team, lets make a call tomorrow morning and get an with the first available cardiologist, hopefully Dr. Krause if available is the preferred interventionist.  But Dr. Rodriguez, Dr. Arzola are also very good alternatives.  I do like the idea of you increasing the rosuvastatin to 10 mg daily, and  continuing this  I do not want you to exercise until you are given permission to do so by the cardiology team just regular activities for now.  I do like the idea of you having a very low threshold for presenting to the emergency room with any slight symptoms.  We discussed those at length      - Cardio Referral - Internal  [facility] [address]  [facility] [address] [phone]       2. Primary hypertension  Stable continue current monitoring management medications    3. Elevated hemoglobin A1c  Stable continue current medications here.        Jeff Anthony D'Amico,   2/25/2025  5:28 PM    Spent 30 minutes obtaining history, evaluating patient, discussing treatment options, diet, exercise, review of available labs and radiology reports, and completing documentation.

## 2025-02-25 NOTE — PROGRESS NOTES
Date of Service 2/25/2025    OSIRIS BELCHER  Date of Birth 5/27/1955    Patient Age: 69 year old    PCP: Jeff Anthony D'Amico, DO  [address] [facility] [address]    Heart Scan Consult  Preliminary Heart Scan Score: 1519  Denies any chest pain, back pain or SOB, that is different than he has had for quite sometime.  Instructed if he does have pain that feels different in his chest he needs to go to the nearest ER.    Previous Screening  Heart Scan Completed Previously: No        Peripheral Vascular Scan Completed Previously: No          Risk Factors  Personal Risk Factors  Non-alterable Risk Factors: Family History (Father had MI late 70's with stents.)  Alterable Risk Factors: High Blood Pressure;Abnormal Cholesterol;Pre-Diabetes;Obstructive sleep apnea;Obesity      Body Mass Index  BMI 44    Blood Pressure  There were no vitals taken for this visit.  (Normal =< 120/80,  Elevated = 120-129/ >80,  High Stage1 130-139/80-89 , Stage2 >140/>90)    Lipid Profile  Cholesterol: 135, done on 1/6/2025.  HDL Cholesterol: 43, done on 1/6/2025.  LDL Cholesterol: 79, done on 1/6/2025.  TriGlycerides 63, done on 1/6/2025.  He is on cholesterol medication.    Decrease the saturated fats in your diet to try and lower the LDL.  Saturated fats are:  red meat - beef, gutierrez, sausage, dairy products - milk, cheese, butter, etc., egg yolks (the egg white has no cholesterol) fried food and fast food.  Increased fiber may help lower LDL - fruits, vegetables, oatmeal.  Can also try over the counter fiber products like Metamucil, Benefiber.    Cholesterol Goals  Value   Total  =< 200   HDL  = > 45 Men = > 55 Women   LDL   =< 100   Triglycerides  =< 150       Glucose and Hemoglobin A1C  Pre Diabetic - not on med.  Lab Results   Component Value Date     (H) 01/06/2025    A1C 5.9 (H) 01/06/2025     (Normal Fasting Glucose < 100mg/dl )    Nurse Review  Risk factor information and results reviewed with Nurse: Yes    Recommended  Follow Up:  Consult your physician regarding:: Final Heart Scan Report;Discuss potential for Incidental Finding;Discuss Potential for Score Variance (Cardiologist will review CT scan to confirm Heart Scan Calcium Score - this can take up to 2 weeks to read.  Radiologist will review CT scan for an over read - this can take up to 1 week to read.  2 Final reports will go to [facility] and to PCP.)      Recommendations for Change:  Nutrition Changes: Low Saturated Fat;Low Fat Dairy    Cholesterol Modification (goal of therapy depends upon your risk): Decrease LDL (Lousy/Bad) Ideal <100 (LDL goal may be changed to have less than 70 or 50 due to your calcium score.  The doctor will discuss.)    Exercise:  (Hold on exercising for now, until your doctor says it is ok.)    Smoking Cessation: > 1 Year Ago    Weight Management: Decrease Current Weight    Stress Management: Adopt Stress Management Techniques    Repeat Heart Scan: Discuss with your Physician              [facility] Recommended Resources:  Recommended Resources: PV Screening;Upcoming Classes, Medical Services and Health Library [email or web address]  Recommended PV Screening: Abdomen;Ankle-Brachial Index (PAO);Carotids         Vandana BUTTS RN        Please Contact the Nurse Heart Line with any Questions or Concerns [phone].

## 2025-02-27 ENCOUNTER — TELEPHONE (OUTPATIENT)
Dept: OTHER | Facility: HOSPITAL | Age: 70
End: 2025-02-27

## 2025-02-27 NOTE — PROGRESS NOTES
I saw patient for the Heart Scan on 2/25/25, patient with Preliminary Calcium score of 1500.  Calling today to discuss what symptoms would feel like.  Patient lives alone and is recently retired, has history of panic attacks.  He spoke with Dr. D'Amico at length about the Preliminary results and symptoms, instructed to go to the ER with chest pain, increased SOB.  Patient has tripped plan to see his brother in [place] in 2 weeks and Dr. ALEXANDER told him to go ahead and go, I agree.  I did ask patient to reach out to PCP office about start ASA 81mg.  He has an appt with Dr. Arzola the beginning of April, this was the first available.  Patient voiced understanding.

## 2025-03-05 ENCOUNTER — APPOINTMENT (OUTPATIENT)
Dept: GENERAL RADIOLOGY | Facility: HOSPITAL | Age: 70
End: 2025-03-05
Attending: EMERGENCY MEDICINE
Payer: MEDICARE

## 2025-03-05 ENCOUNTER — HOSPITAL ENCOUNTER (EMERGENCY)
Facility: HOSPITAL | Age: 70
Discharge: HOME | End: 2025-03-05
Attending: EMERGENCY MEDICINE
Payer: MEDICARE

## 2025-03-05 VITALS
OXYGEN SATURATION: 99 % | WEIGHT: 274 LBS | BODY MASS INDEX: 44 KG/M2 | RESPIRATION RATE: 20 BRPM | SYSTOLIC BLOOD PRESSURE: 121 MMHG | DIASTOLIC BLOOD PRESSURE: 82 MMHG | TEMPERATURE: 97 F | HEART RATE: 96 BPM

## 2025-03-05 DIAGNOSIS — R07.9 CHEST PAIN OF UNCERTAIN ETIOLOGY: Primary | ICD-10-CM

## 2025-03-05 LAB
ALBUMIN SERPL-MCNC: 4.5 G/DL (ref 3.2–4.8)
ALP LIVER SERPL-CCNC: 85 U/L
ALT SERPL-CCNC: 22 U/L
ANION GAP SERPL CALC-SCNC: 10 MMOL/L (ref 0–18)
AST SERPL-CCNC: 17 U/L (ref ?–34)
ATRIAL RATE: 106 BPM
BASOPHILS # BLD AUTO: 0.09 X10(3) UL (ref 0–0.2)
BASOPHILS NFR BLD AUTO: 0.8 %
BILIRUB DIRECT SERPL-MCNC: 0.3 MG/DL (ref ?–0.3)
BILIRUB SERPL-MCNC: 1 MG/DL (ref 0.2–1.1)
BUN BLD-MCNC: 23 MG/DL (ref 9–23)
BUN/CREAT SERPL: 14.8 (ref 10–20)
CALCIUM BLD-MCNC: 9.1 MG/DL (ref 8.7–10.4)
CHLORIDE SERPL-SCNC: 101 MMOL/L (ref 98–112)
CO2 SERPL-SCNC: 26 MMOL/L (ref 21–32)
CREAT BLD-MCNC: 1.55 MG/DL
DEPRECATED RDW RBC AUTO: 39.8 FL (ref 35.1–46.3)
EGFRCR SERPLBLD CKD-EPI 2021: 48 ML/MIN/1.73M2 (ref 60–?)
EOSINOPHIL # BLD AUTO: 0.27 X10(3) UL (ref 0–0.7)
EOSINOPHIL NFR BLD AUTO: 2.3 %
ERYTHROCYTE [DISTWIDTH] IN BLOOD BY AUTOMATED COUNT: 12.6 % (ref 11–15)
GLUCOSE BLD-MCNC: 89 MG/DL (ref 70–99)
HCT VFR BLD AUTO: 37.4 %
HGB BLD-MCNC: 13.1 G/DL
IMM GRANULOCYTES # BLD AUTO: 0.03 X10(3) UL (ref 0–1)
IMM GRANULOCYTES NFR BLD: 0.3 %
LYMPHOCYTES # BLD AUTO: 2.05 X10(3) UL (ref 1–4)
LYMPHOCYTES NFR BLD AUTO: 17.8 %
MCH RBC QN AUTO: 30.8 PG (ref 26–34)
MCHC RBC AUTO-ENTMCNC: 35 G/DL (ref 31–37)
MCV RBC AUTO: 88 FL
MONOCYTES # BLD AUTO: 0.8 X10(3) UL (ref 0.1–1)
MONOCYTES NFR BLD AUTO: 7 %
NEUTROPHILS # BLD AUTO: 8.27 X10 (3) UL (ref 1.5–7.7)
NEUTROPHILS # BLD AUTO: 8.27 X10(3) UL (ref 1.5–7.7)
NEUTROPHILS NFR BLD AUTO: 71.8 %
OSMOLALITY SERPL CALC.SUM OF ELEC: 287 MOSM/KG (ref 275–295)
P AXIS: 50 DEGREES
P-R INTERVAL: 202 MS
PLATELET # BLD AUTO: 204 10(3)UL (ref 150–450)
POTASSIUM SERPL-SCNC: 4.3 MMOL/L (ref 3.5–5.1)
PROT SERPL-MCNC: 7.3 G/DL (ref 5.7–8.2)
Q-T INTERVAL: 326 MS
QRS DURATION: 100 MS
QTC CALCULATION (BEZET): 433 MS
R AXIS: 43 DEGREES
RBC # BLD AUTO: 4.25 X10(6)UL
SODIUM SERPL-SCNC: 137 MMOL/L (ref 136–145)
T AXIS: 24 DEGREES
TROPONIN I SERPL HS-MCNC: 7 NG/L
VENTRICULAR RATE: 106 BPM
WBC # BLD AUTO: 11.5 X10(3) UL (ref 4–11)

## 2025-03-05 PROCEDURE — 85025 COMPLETE CBC W/AUTO DIFF WBC: CPT | Performed by: EMERGENCY MEDICINE

## 2025-03-05 PROCEDURE — 80048 BASIC METABOLIC PNL TOTAL CA: CPT | Performed by: EMERGENCY MEDICINE

## 2025-03-05 PROCEDURE — 93010 ELECTROCARDIOGRAM REPORT: CPT

## 2025-03-05 PROCEDURE — 36415 COLL VENOUS BLD VENIPUNCTURE: CPT

## 2025-03-05 PROCEDURE — 80076 HEPATIC FUNCTION PANEL: CPT | Performed by: EMERGENCY MEDICINE

## 2025-03-05 PROCEDURE — 84484 ASSAY OF TROPONIN QUANT: CPT | Performed by: EMERGENCY MEDICINE

## 2025-03-05 PROCEDURE — 71045 X-RAY EXAM CHEST 1 VIEW: CPT | Performed by: EMERGENCY MEDICINE

## 2025-03-05 PROCEDURE — 93005 ELECTROCARDIOGRAM TRACING: CPT

## 2025-03-05 PROCEDURE — 99285 EMERGENCY DEPT VISIT HI MDM: CPT

## 2025-03-05 PROCEDURE — 99284 EMERGENCY DEPT VISIT MOD MDM: CPT

## 2025-03-05 RX ORDER — ASPIRIN 81 MG/1
TABLET ORAL
COMMUNITY
Start: 2025-02-28

## 2025-03-05 NOTE — ED INITIAL ASSESSMENT (HPI)
Patient presents to the ED c/o episode of left sided chest pain and dull ache to left arm while driving home from therapist appointment.   Pt reports increased anxiety lately concerning an upcoming stress test and echo.   Pt states he took 0.25mg xanax at 1130.

## 2025-03-05 NOTE — ED PROVIDER NOTES
Patient Seen in: [facility] Emergency Department    History     Chief Complaint   Patient presents with    Chest Pain    Anxiety/Panic attack       HPI    69-year-old male here with an episode of left-sided chest dual achy sensation which is identical to the episodes he has had in the past when he has had anxiety over the last year occurring approximately every week.  Denies any dyspnea.  Improved mildly with Xanax which is very typical of this chest sensation to improve with Xanax.  Denies any lower extremity edema.  No fevers.    History reviewed.   Past Medical History:    Anxiety state    Primary hypertension    Rheumatoid arthritis (HCC)    Sleep apnea       History reviewed.   Past Surgical History:   Procedure Laterality Date    Colonoscopy N/A 3/8/2023    Procedure: COLONOSCOPY;  Surgeon: Viet Olmos MD;  Location: [facility] ENDOSCOPY    Hernia surgery      UMBILICAL    Total knee replacement Right     Total knee replacement Left 2017         Medications :  Prescriptions Prior to Admission[1]     Family History   Problem Relation Age of Onset    Colon Cancer Father     Heart Attack Father     Other (ALZHEIMER'S) Mother     Ear Problems Other     Cancer Other        Smoking Status:   Social History     Socioeconomic History    Marital status:    Tobacco Use    Smoking status: Former     Current packs/day: 0.00     Types: Cigarettes     Start date: 3/6/1984     Quit date: 3/6/2007     Years since quittin.0     Passive exposure: Never    Smokeless tobacco: Never   Vaping Use    Vaping status: Never Used   Substance and Sexual Activity    Alcohol use: Yes     Comment: SOCIALLY    Drug use: Never   Other Topics Concern    Outdoor occupation No    Reaction to local anesthetic No    Pt has a pacemaker No    Pt has a defibrillator No       Constitutional and vital signs reviewed.      Social History and Family History elements reviewed from today, pertinent positives to the presenting  problem noted.    Physical Exam     ED Triage Vitals [03/05/25 1346]   /82   Pulse 96   Resp 20   Temp 97 °F (36.1 °C)   Temp src Temporal   SpO2 99 %   O2 Device None (Room air)       All measures to prevent infection transmission during my interaction with the patient were taken. The patient was already wearing a droplet mask on my arrival to the room. Personal protective equipment was worn throughout the duration of the exam.  Handwashing was performed prior to and after the exam.  Stethoscope and any equipment used during my examination was cleaned with super sani-cloth germicidal wipes following the exam.     Physical Exam    General: NAD  Head: Normocephalic and atraumatic.  Mouth/Throat/Ears/Nose: Oropharynx is clear    Eyes: Conjunctivae and EOM are normal.   Neck: Normal range of motion. Supple.   Cardiovascular: Normal rate, regular rhythm, normal heart sounds.  Respiratory/Chest: Clear and equal bilaterally. Exhibits no tenderness.  Gastrointestinal: Soft, non-tender, non-distended. Bowel sounds are normal.   Musculoskeletal:No swelling or deformity.   Neurological: Alert and appropriate. No focal deficits.   Skin: Skin is warm and dry. No pallor.  Psychiatric: Has a normal mood and affect.      ED Course        Labs Reviewed   BASIC METABOLIC PANEL (8) - Abnormal; Notable for the following components:       Result Value    Creatinine 1.55 (*)     eGFR-Cr 48 (*)     All other components within normal limits   CBC WITH DIFFERENTIAL WITH PLATELET - Abnormal; Notable for the following components:    WBC 11.5 (*)     HCT 37.4 (*)     Neutrophil Absolute Prelim 8.27 (*)     Neutrophil Absolute 8.27 (*)     All other components within normal limits   HEPATIC FUNCTION PANEL (7) - Normal   TROPONIN I HIGH SENSITIVITY - Normal     EKG    Rate, intervals and axes as noted on EKG Report.  Rate: 106  Rhythm: Sinus Rhythm  Reading: Sinus tachycardia.  No STEMI.           As Interpreted by me    Imaging Results  Available and Reviewed while in ED: XR CHEST AP PORTABLE  (CPT=71045)    Result Date: 3/5/2025  CONCLUSION:   1. Cardiac enlargement with secondary signs of slight interstitial edema.  2. Minimal streaky opacities at the lung bases which could represent atelectasis, infiltrate, or a combination.    Dictated by (CST): Tahir Caceres MD on 3/05/2025 at 4:56 PM     Finalized by (CST): Tahir Caceres MD on 3/05/2025 at 4:57 PM         ED Medications Administered: Medications - No data to display      MDM     Vitals:    03/05/25 1346 03/05/25 1700   BP: 119/82 121/82   Pulse: 96    Resp: 20    Temp: 97 °F (36.1 °C)    TempSrc: Temporal    SpO2: 99%    Weight: 124.3 kg      *I personally reviewed and interpreted all ED vitals.    Pulse Ox: 99%, Room air, Normal     Monitor Interpretation:   sinus tachycardia as interpreted by me.  The cardiac monitor was ordered given chest pain.      Medical Decision Making      Differential Diagnosis/ Diagnostic Considerations: ACS, pleurisy, anxiety.    Complicating Factors: The patient already has anxiety to contribute to the complexity of this ED evaluation.    I reviewed prior chart records including office note from February 28, 2025.  Lab work reviewed, troponin negative, presentation inconsistent with ACS.  Chest x-ray unremarkable for pneumothorax on my interpretation.  On reevaluation, completely asymptomatic and already has stress test follow-up with cardiology in less than a week from now and was advised to continue this plan and follow-up with his cardiologist and PCP    Dc In stable condition.  Patient is comfortable with the plan.  Prescriptions: Continue with the daily baby aspirin.    Disposition and Plan     Clinical Impression:  1. Chest pain of uncertain etiology        Disposition:  Discharge    Follow-up:  D'Amico, Jeff Anthony, DO  [address] [place] [id number]  [phone]    Schedule an appointment as soon as possible for a visit in 1  day(s)      continue with cardiology follow up plan    Follow up in 2 day(s)        Medications Prescribed:  Discharge Medication List as of 3/5/2025  5:08 PM                           [1] (Not in a hospital admission)

## 2025-03-06 ENCOUNTER — PATIENT OUTREACH (OUTPATIENT)
Dept: CASE MANAGEMENT | Age: 70
End: 2025-03-06

## 2025-03-06 NOTE — PROGRESS NOTES
NCM attempted to contact the patient for MICKEY. No answer after multiple rings and no VM turned on.

## 2025-03-07 NOTE — PROGRESS NOTES
NCM attempted to contact the patient for MICKEY. There was no dial tone and phone would not ring.

## 2025-03-15 ENCOUNTER — PATIENT MESSAGE (OUTPATIENT)
Dept: INTERNAL MEDICINE CLINIC | Facility: CLINIC | Age: 70
End: 2025-03-15

## 2025-03-17 RX ORDER — ROSUVASTATIN CALCIUM 10 MG/1
10 TABLET, COATED ORAL NIGHTLY
Qty: 90 TABLET | Refills: 3 | Status: SHIPPED | OUTPATIENT
Start: 2025-03-17

## 2025-03-17 NOTE — TELEPHONE ENCOUNTER
New Rx sent per Dr D'Amico's 2/25/25 telephone visit to increase dosage noted below. Patient notified Rx has been sent as requested.     1. Elevated coronary artery calcium score  Nice job getting the test done here, with an elevated score I do want you to communicate and see the cardiology team, lets make a call tomorrow morning and get an with the first available cardiologist, hopefully Dr. Krause if available is the preferred interventionist.  But Dr. Rodriguez, Dr. Arzola are also very good alternatives.  I do like the idea of you increasing the rosuvastatin to 10 mg daily, and continuing this  I do not want you to exercise until you are given permission to do so by the cardiology team just regular activities for now.  I do like the idea of you having a very low threshold for presenting to the emergency room with any slight symptoms.  We discussed those at length

## 2025-04-15 ENCOUNTER — LAB ENCOUNTER (OUTPATIENT)
Dept: LAB | Age: 70
End: 2025-04-15
Attending: INTERNAL MEDICINE
Payer: MEDICARE

## 2025-04-15 DIAGNOSIS — I10 ESSENTIAL HYPERTENSION, MALIGNANT: ICD-10-CM

## 2025-04-15 DIAGNOSIS — R06.09 DYSPNEA ON EXERTION: Primary | ICD-10-CM

## 2025-04-15 LAB
ANION GAP SERPL CALC-SCNC: 8 MMOL/L (ref 0–18)
BUN BLD-MCNC: 26 MG/DL (ref 9–23)
BUN/CREAT SERPL: 18.2 (ref 10–20)
CALCIUM BLD-MCNC: 9.1 MG/DL (ref 8.7–10.4)
CHLORIDE SERPL-SCNC: 103 MMOL/L (ref 98–112)
CO2 SERPL-SCNC: 28 MMOL/L (ref 21–32)
CREAT BLD-MCNC: 1.43 MG/DL (ref 0.7–1.3)
EGFRCR SERPLBLD CKD-EPI 2021: 53 ML/MIN/1.73M2 (ref 60–?)
FASTING STATUS PATIENT QL REPORTED: YES
GLUCOSE BLD-MCNC: 103 MG/DL (ref 70–99)
OSMOLALITY SERPL CALC.SUM OF ELEC: 293 MOSM/KG (ref 275–295)
POTASSIUM SERPL-SCNC: 4.4 MMOL/L (ref 3.5–5.1)
SODIUM SERPL-SCNC: 139 MMOL/L (ref 136–145)

## 2025-04-15 PROCEDURE — 36415 COLL VENOUS BLD VENIPUNCTURE: CPT

## 2025-04-15 PROCEDURE — 80048 BASIC METABOLIC PNL TOTAL CA: CPT

## 2025-05-06 ENCOUNTER — PATIENT MESSAGE (OUTPATIENT)
Dept: INTERNAL MEDICINE CLINIC | Facility: CLINIC | Age: 70
End: 2025-05-06

## 2025-05-06 DIAGNOSIS — E66.813 CLASS 3 SEVERE OBESITY WITH SERIOUS COMORBIDITY AND BODY MASS INDEX (BMI) OF 45.0 TO 49.9 IN ADULT, UNSPECIFIED OBESITY TYPE: Primary | ICD-10-CM

## 2025-05-19 ENCOUNTER — TELEPHONE (OUTPATIENT)
Dept: INTERNAL MEDICINE CLINIC | Facility: CLINIC | Age: 70
End: 2025-05-19

## 2025-05-19 DIAGNOSIS — F41.0 PANIC ATTACK: ICD-10-CM

## 2025-05-19 NOTE — TELEPHONE ENCOUNTER
To MD:  The above refill request is for a controlled substance.  Please review pended medication order.   Print and sign for staff to fax to pharmacy or prescribe electronically.    Last office visit: 1/9/2025  Last time refill sent and quantity/refills: 2/4/2025 #40 with 5 refills

## 2025-05-21 RX ORDER — ALPRAZOLAM 0.25 MG
0.25 TABLET ORAL EVERY 6 HOURS PRN
Qty: 40 TABLET | Refills: 2 | Status: SHIPPED | OUTPATIENT
Start: 2025-05-21

## 2025-06-11 ENCOUNTER — PATIENT MESSAGE (OUTPATIENT)
Dept: INTERNAL MEDICINE CLINIC | Facility: CLINIC | Age: 70
End: 2025-06-11

## 2025-06-11 NOTE — TELEPHONE ENCOUNTER
Called patient who had dizziness this morning for a couple minutes -had this in October and saw ENT - RN advised to monitor and call ENT - if symptoms get worse he should go to  - verbalized understanding   Will give us update 6/12

## 2025-06-18 ENCOUNTER — TELEPHONE (OUTPATIENT)
Dept: INTERNAL MEDICINE CLINIC | Facility: CLINIC | Age: 70
End: 2025-06-18

## 2025-06-18 NOTE — TELEPHONE ENCOUNTER
Yahaira Maher I would definitely like to meet with you next week regarding your message above. I tried to schedule an appointment but nothing available. I did send in a request though. Take care. Jerardo     To

## 2025-06-24 ENCOUNTER — PATIENT MESSAGE (OUTPATIENT)
Dept: INTERNAL MEDICINE CLINIC | Facility: CLINIC | Age: 70
End: 2025-06-24

## 2025-06-26 DIAGNOSIS — I10 PRIMARY HYPERTENSION: ICD-10-CM

## 2025-06-26 RX ORDER — LISINOPRIL AND HYDROCHLOROTHIAZIDE 20; 25 MG/1; MG/1
1 TABLET ORAL DAILY
Qty: 90 TABLET | Refills: 0 | Status: SHIPPED | OUTPATIENT
Start: 2025-06-26

## 2025-06-26 NOTE — TELEPHONE ENCOUNTER
Refill request is for a maintenance medication and has met the criteria specified in the Ambulatory Medication Refill Standing Order for eligibility, visits, laboratory, alerts and was sent to the requested pharmacy.    Requested Prescriptions     Signed Prescriptions Disp Refills    lisinopril-hydroCHLOROthiazide 20-25 MG Oral Tab 90 tablet 0     Sig: Take 1 tablet by mouth daily.     Authorizing Provider: D'AMICO, JEFF ANTHONY     Ordering User: DANK LOZADA

## 2025-06-30 DIAGNOSIS — I10 PRIMARY HYPERTENSION: ICD-10-CM

## 2025-07-11 RX ORDER — LISINOPRIL AND HYDROCHLOROTHIAZIDE 20; 25 MG/1; MG/1
1 TABLET ORAL DAILY
Qty: 90 TABLET | Refills: 0 | OUTPATIENT
Start: 2025-07-11

## 2025-07-11 NOTE — TELEPHONE ENCOUNTER
90 days was just sent in on 6/26. Closure message to pt     Current refill request refused due to refill is either a duplicate request or has active refills at the pharmacy.  Check previous templates.    Requested Prescriptions     Refused Prescriptions Disp Refills    LISINOPRIL-HYDROCHLOROTHIAZIDE 20-25 MG Oral Tab [Pharmacy Med Name: LISINOPRIL-HCTZ 20-25 MG TAB] 90 tablet 0     Sig: TAKE 1 TABLET BY MOUTH EVERY DAY     Refused By: DANK LOZADA     Reason for Refusal: Duplicate refill request

## 2025-07-30 NOTE — PAYOR COMM NOTE
Progress Notes by Macie Suarez MD at 3/17/2017  3:32 PM      Author: Macie Suarez MD Service: (none) Author Type: Physician     Filed: 3/17/2017  3:40 PM Note Time: 3/17/2017  3:32 PM Status: Signed     : Macie Suarez MD (Physician)       Es Choudhury Wilbur

## 2025-08-06 ENCOUNTER — TELEMEDICINE (OUTPATIENT)
Dept: INTERNAL MEDICINE CLINIC | Facility: CLINIC | Age: 70
End: 2025-08-06

## 2025-08-06 DIAGNOSIS — R05.1 ACUTE COUGH: ICD-10-CM

## 2025-08-06 DIAGNOSIS — J06.9 VIRAL UPPER RESPIRATORY TRACT INFECTION: Primary | ICD-10-CM

## 2025-08-06 PROCEDURE — 1159F MED LIST DOCD IN RCRD: CPT | Performed by: INTERNAL MEDICINE

## 2025-08-06 PROCEDURE — 1160F RVW MEDS BY RX/DR IN RCRD: CPT | Performed by: INTERNAL MEDICINE

## 2025-08-06 PROCEDURE — G2211 COMPLEX E/M VISIT ADD ON: HCPCS | Performed by: INTERNAL MEDICINE

## 2025-08-06 PROCEDURE — 99213 OFFICE O/P EST LOW 20 MIN: CPT | Performed by: INTERNAL MEDICINE

## 2025-08-06 RX ORDER — BENZONATATE 100 MG/1
100 CAPSULE ORAL 3 TIMES DAILY PRN
Qty: 30 CAPSULE | Refills: 0 | Status: SHIPPED | OUTPATIENT
Start: 2025-08-06

## 2025-08-06 RX ORDER — PREDNISONE 20 MG/1
40 TABLET ORAL DAILY
Qty: 10 TABLET | Refills: 0 | Status: SHIPPED | OUTPATIENT
Start: 2025-08-06 | End: 2025-08-11

## (undated) DEVICE — STERILE LATEX POWDER-FREE SURGICAL GLOVESWITH NITRILE COATING: Brand: PROTEXIS

## (undated) DEVICE — SUTURE VICRYL 0 J340H

## (undated) DEVICE — BLADE SCPL 10 SHAW KNF PRCS

## (undated) DEVICE — KIT ENDO ORCAPOD 160/180/190

## (undated) DEVICE — VIOLET BRAIDED (POLYGLACTIN 910), SYNTHETIC ABSORBABLE SUTURE: Brand: COATED VICRYL

## (undated) DEVICE — PETROLATUM GAUZE CISION DRESSING: Brand: VASELINE

## (undated) DEVICE — Device: Brand: DUAL NARE NASAL CANNULAE FEMALE LUER CON 7FT O2 TUBE

## (undated) DEVICE — TOTAL KNEE: Brand: MEDLINE INDUSTRIES, INC.

## (undated) DEVICE — FORCEP RADIAL JAW 4

## (undated) DEVICE — Device: Brand: POWER-FLO®

## (undated) DEVICE — 60 ML SYRINGE REGULAR TIP: Brand: MONOJECT

## (undated) DEVICE — PRECISION THIN (27.0 X 0.38MM)

## (undated) DEVICE — SUTURE ETHIBOND 1 CT-1

## (undated) DEVICE — TROCAR

## (undated) DEVICE — COTTON ROLL: Brand: DEROYAL

## (undated) DEVICE — SNARE CAPTIFLEX MICRO-OVL OLY

## (undated) DEVICE — FAN SPRAY KIT: Brand: PULSAVAC®

## (undated) DEVICE — GAUZE SPONGES,12 PLY: Brand: CURITY

## (undated) DEVICE — MEDI-VAC NON-CONDUCTIVE SUCTION TUBING 6MM X 1.8M (6FT.) L: Brand: CARDINAL HEALTH

## (undated) DEVICE — DUAL CUT SAGITTAL BLADE

## (undated) DEVICE — SOLUTION SURG DURA PREP HAZMAT

## (undated) DEVICE — BANDAGE FLXMSTR 11YDX6IN STRL

## (undated) DEVICE — TRAP 4 CPTR CHMBR N EZ INLN

## (undated) DEVICE — KENDALL SCD EXPRESS SLEEVES, KNEE LENGTH, MEDIUM: Brand: KENDALL SCD

## (undated) DEVICE — SURETRANS AUTOTRANSFUSION SYSTEM FOR ORTHOPAEDICS WITH PVC DRAIN AND 2 TROCARS: Brand: SURETRANS AUTOTRANSFUSION SYSTEM FOR ORTHOPAEDICS

## (undated) DEVICE — SOL  .9 1000ML BTL

## (undated) DEVICE — BATTERY

## (undated) DEVICE — SUTURE ETHILON 3-0 669H

## (undated) DEVICE — T5 HOOD WITH PEEL AWAY FACE SHIELD

## (undated) DEVICE — ZIMMER® STERILE DISPOSABLE TOURNIQUET CUFF WITH PLC, DUAL PORT, SINGLE BLADDER, 34 IN. (86 CM)

## (undated) DEVICE — KIT CLEAN ENDOKIT 1.1OZ GOWNX2

## (undated) DEVICE — SOL  .9 3000ML

## (undated) DEVICE — GOWN SURG AERO BLUE PERF LG

## (undated) DEVICE — SUTURE SILK 0 FSL

## (undated) DEVICE — BLADE SAW SAGITTAL 19.5

## (undated) DEVICE — TRAY SRGPRP PVP IOD WT SCRB SM

## (undated) DEVICE — COVER SGL STRL LGHT HNDL BLU

## (undated) NOTE — LETTER
[address]  Authorization for Invasive Procedure                                                                                           1. I hereby authorize Flor Tate MD, my physician and his/her assistants (if applicable), which may include medical students, residents, and/or fellows, to perform the following surgical operation/ procedure and administer such anesthesia as may be determined necessary by my physician: Operation/Procedure name (s) COLONOSCOPY on [address]   2. I recognize that during the surgical operation/procedure, unforeseen conditions may necessitate additional or different procedures than those listed above. I, therefore, further authorize and request that the above-named surgeon, assistants, or designees perform such procedures as are, in their judgment, necessary and desirable. 3.   My surgeon/physician has discussed prior to my surgery the potential benefits, risks and side effects of this procedure; the likelihood of achieving goals; and potential problems that might occur during recuperation. They also discussed reasonable alternatives to the procedure, including risks, benefits, and side effects related to the alternatives and risks related to not receiving this procedure. I have had all my questions answered and I acknowledge that no guarantee has been made as to the result that may be obtained. 4.   Should the need arise during my operation/procedure, which includes change of level of care prior to discharge, I also consent to the administration of blood and/or blood products. Further, I understand that despite careful testing and screening of blood or blood products by collecting agencies, I may still be subject to ill effects as a result of receiving a blood transfusion and/or blood products.   The following are some, but not all, of the potential risks that can occur: fever and allergic reactions, hemolytic reactions, transmission of diseases such as Hepatitis, AIDS and Cytomegalovirus (CMV) and fluid overload. In the event that I wish to have an autologous transfusion of my own blood, or a directed donor transfusion, I will discuss this with my physician. Check only if Refusing Blood or Blood Products  I understand refusal of blood or blood products as deemed necessary by my physician may have serious consequences to my condition to include possible death. I hereby assume responsibility for my refusal and release the hospital, its personnel, and my physicians from any responsibility for the consequences of my refusal.    o  Refuse   5. I authorize the use of any specimen, organs, tissues, body parts or foreign objects that may be removed from my body during the operation/procedure for diagnosis, research or teaching purposes and their subsequent disposal by hospital authorities. I also authorize the release of specimen test results and/or written reports to my treating physician on the hospital medical staff or other referring or consulting physicians involved in my care, at the discretion of the Pathologist or my treating physician. 6.   I consent to the photographing or videotaping of the operations or procedures to be performed, including appropriate portions of my body for medical, scientific, or educational purposes, provided my identity is not revealed by the pictures or by descriptive texts accompanying them. If the procedure has been photographed/videotaped, the surgeon will obtain the original picture, image, videotape or CD. The hospital will not be responsible for storage, release or maintenance of the picture, image, tape or CD.    7.   I consent to the presence of a  or observers in the operating room as deemed necessary by my physician or their designees.     8.   I recognize that in the event my procedure results in extended X-Ray/fluoroscopy time, I may develop a skin reaction. 9. If I have a Do Not Attempt Resuscitation (DNAR) order in place, that status will be suspended while in the operating room, procedural suite, and during the recovery period unless otherwise explicitly stated by me (or a person authorized to consent on my behalf). The surgeon or my attending physician will determine when the applicable recovery period ends for purposes of reinstating the DNAR order. 10. Patients having a sterilization procedure: I understand that if the procedure is successful the results will be permanent and it will therefore be impossible for me to inseminate, conceive, or bear children. I also understand that the procedure is intended to result in sterility, although the result has not been guaranteed. 11. I acknowledge that my physician has explained sedation/analgesia administration to me including the risk and benefits I consent to the administration of sedation/analgesia as may be necessary or desirable in the judgment of my physician. I CERTIFY THAT I HAVE READ AND FULLY UNDERSTAND THE ABOVE CONSENT TO OPERATION and/or OTHER PROCEDURE.     _________________________________________ _________________________________     ___________________________________  Signature of Patient     Signature of Responsible Person                   Printed Name of Responsible Person                              _________________________________________ ______________________________        ___________________________________  Signature of Witness         Date  Time         Relationship to Patient    STATEMENT OF PHYSICIAN My signature below affirms that prior to the time of the procedure; I have explained to the patient and/or his/her legal representative, the risks and benefits involved in the proposed treatment and any reasonable alternative to the proposed treatment.  I have also explained the risks and benefits involved in refusal of the proposed treatment and alternatives to the proposed treatment and have answered the patient's questions.  If I have a significant financial interest in a co-management agreement or a significant financial interest in any product or implant, or other significant relationship used in this procedure/surgery, I have disclosed this and had a discussion with my patient.     _______________________________________________________________ _____________________________  Caroline Cox  Physician)                                                                                         (Date)                                   (Time)  Patient Name: Spring Pathak    : 1955   Printed: 3/6/2023      Medical Record #: [id number]                                              Page 1 of 1

## (undated) NOTE — IP AVS SNAPSHOT
[address]  [address] [facility] [phone]                Discharge Summary   3/13/2017    Dasha Trinidad           Admission Information        Provider Department    3/13/2017 Suzy Hartman MD [facility] 4w/S Commonly known as:  [facility]CO        Take 1 tablet by mouth every 6 (six) hours as needed.     Glorine Breeding                    Take 1 tablet every 6 hrs as needed for pain       PEG 3350 Pack   Last time this was given:  17 g on 3/18/2017 10:00 AM   Commonly ACETAMINOPHEN; HYDROCODONE TABLETS OR CAPSULES (ENGLISH)    KNEE REPLACEMENT, AFTER: THE FIRST MONTH  (ENGLISH)    KNEE REPLACEMENT, EXERCISES BEFORE: ANKLE PUMPS, QUAD SETS, LEG RAISES (ENGLISH)    KNEE REPLACEMENT, EXERCISES BEFORE: HELP WITH WALKER OR 69 (03/15/17)  16 (03/15/17)  14 (03/15/17)  1 (03/15/17)  1 -- (03/15/17)  7.2 (03/15/17)  1.7 (03/15/17)  1.4 (H) (03/15/17)  0.1 (03/15/17)  0.1    (03/14/17)  73 (03/14/17)  14 (03/14/17)  11 (03/14/17)  1 (03/14/17)  1  (03/14/17)  8.4 (H) (03/14/17) and ask to get set up for an insurance coverage that is in-network with [facility]. [employer]     Sign up for [employer], your secure online medical record.   [employer] will allow you to access patient instructions from your recent visit,  view Most common side effects: Constipation, drowsiness, dizziness, urinary retention (inability to urinate)   What to report to your healthcare team: Difficulty urinating, dizziness, no bowel movement in 2+ days, unresolved pain           Non-Narcotic Pain Med

## (undated) NOTE — ED AVS SNAPSHOT
Rachell Bloom   MRN: [id number]    Department:  [facility] Emergency Department   Date of Visit:  3/28/2018           Disclosure     Insurance plans vary and the physician(s) referred by the ER may not be covered by your plan.  Please contac CARE PHYSICIAN AT ONCE OR RETURN IMMEDIATELY TO THE EMERGENCY DEPARTMENT. If you have been prescribed any medication(s), please fill your prescription right away and begin taking the medication(s) as directed.   If you believe that any of the medications

## (undated) NOTE — LETTER
Date & Time: 3/29/2018, 12:06 AM  Patient: Guy Gates  Attending Provider:    Sincerely,  1. On File, E CATHERINE Attending  2.  Seema Manuel MD         To Whom It May Concern:    Minesh Peña was seen and treated in our department on 3/28/2018